# Patient Record
Sex: FEMALE | NOT HISPANIC OR LATINO
[De-identification: names, ages, dates, MRNs, and addresses within clinical notes are randomized per-mention and may not be internally consistent; named-entity substitution may affect disease eponyms.]

---

## 2017-06-16 ENCOUNTER — APPOINTMENT (OUTPATIENT)
Dept: UROLOGY | Facility: CLINIC | Age: 77
End: 2017-06-16

## 2017-12-08 ENCOUNTER — APPOINTMENT (OUTPATIENT)
Dept: UROLOGY | Facility: CLINIC | Age: 77
End: 2017-12-08
Payer: MEDICARE

## 2017-12-08 PROCEDURE — 99213 OFFICE O/P EST LOW 20 MIN: CPT

## 2017-12-26 ENCOUNTER — OUTPATIENT (OUTPATIENT)
Dept: OUTPATIENT SERVICES | Facility: HOSPITAL | Age: 77
LOS: 1 days | Discharge: HOME | End: 2017-12-26
Payer: MEDICARE

## 2017-12-26 DIAGNOSIS — C49.9 MALIGNANT NEOPLASM OF CONNECTIVE AND SOFT TISSUE, UNSPECIFIED: ICD-10-CM

## 2018-01-02 ENCOUNTER — RESULT REVIEW (OUTPATIENT)
Age: 78
End: 2018-01-02

## 2018-01-02 ENCOUNTER — APPOINTMENT (OUTPATIENT)
Dept: HEMATOLOGY ONCOLOGY | Facility: CLINIC | Age: 78
End: 2018-01-02
Payer: MEDICARE

## 2018-01-02 ENCOUNTER — LABORATORY RESULT (OUTPATIENT)
Age: 78
End: 2018-01-02

## 2018-01-02 VITALS
WEIGHT: 143.3 LBS | HEART RATE: 76 BPM | OXYGEN SATURATION: 99 % | TEMPERATURE: 98.1 F | SYSTOLIC BLOOD PRESSURE: 140 MMHG | BODY MASS INDEX: 26.21 KG/M2 | DIASTOLIC BLOOD PRESSURE: 80 MMHG | RESPIRATION RATE: 16 BRPM

## 2018-01-02 DIAGNOSIS — I10 ESSENTIAL (PRIMARY) HYPERTENSION: ICD-10-CM

## 2018-01-02 LAB
HCT VFR BLD CALC: 41.8 % — SIGNIFICANT CHANGE UP (ref 34.5–45)
HGB BLD-MCNC: 14.2 G/DL — SIGNIFICANT CHANGE UP (ref 11.5–15.5)
MCHC RBC-ENTMCNC: 30.3 PG — SIGNIFICANT CHANGE UP (ref 27–34)
MCHC RBC-ENTMCNC: 34 G/DL — SIGNIFICANT CHANGE UP (ref 32–36)
MCV RBC AUTO: 89.2 FL — SIGNIFICANT CHANGE UP (ref 80–100)
PLATELET # BLD AUTO: 198 K/UL — SIGNIFICANT CHANGE UP (ref 150–400)
RBC # BLD: 4.69 M/UL — SIGNIFICANT CHANGE UP (ref 3.8–5.2)
RBC # FLD: 12 % — SIGNIFICANT CHANGE UP (ref 10.3–14.5)
WBC # BLD: 5.4 K/UL — SIGNIFICANT CHANGE UP (ref 3.8–10.5)
WBC # FLD AUTO: 5.4 K/UL — SIGNIFICANT CHANGE UP (ref 3.8–10.5)

## 2018-01-02 PROCEDURE — 99205 OFFICE O/P NEW HI 60 MIN: CPT

## 2018-01-02 RX ORDER — ROSUVASTATIN CALCIUM 10 MG/1
10 TABLET, FILM COATED ORAL
Refills: 0 | Status: ACTIVE | COMMUNITY

## 2018-01-02 RX ORDER — LISINOPRIL 40 MG/1
40 TABLET ORAL
Refills: 0 | Status: ACTIVE | COMMUNITY

## 2018-01-03 DIAGNOSIS — C78.6 SECONDARY MALIGNANT NEOPLASM OF RETROPERITONEUM AND PERITONEUM: ICD-10-CM

## 2018-01-11 ENCOUNTER — RESULT REVIEW (OUTPATIENT)
Age: 78
End: 2018-01-11

## 2018-01-11 PROCEDURE — 88321 CONSLTJ&REPRT SLD PREP ELSWR: CPT

## 2018-03-22 ENCOUNTER — OUTPATIENT (OUTPATIENT)
Dept: OUTPATIENT SERVICES | Facility: HOSPITAL | Age: 78
LOS: 1 days | Discharge: HOME | End: 2018-03-22

## 2018-03-22 DIAGNOSIS — C78.6 SECONDARY MALIGNANT NEOPLASM OF RETROPERITONEUM AND PERITONEUM: ICD-10-CM

## 2018-03-26 ENCOUNTER — APPOINTMENT (OUTPATIENT)
Dept: HEMATOLOGY ONCOLOGY | Facility: CLINIC | Age: 78
End: 2018-03-26
Payer: MEDICARE

## 2018-03-26 PROCEDURE — 99214 OFFICE O/P EST MOD 30 MIN: CPT

## 2018-06-07 ENCOUNTER — OUTPATIENT (OUTPATIENT)
Dept: OUTPATIENT SERVICES | Facility: HOSPITAL | Age: 78
LOS: 1 days | Discharge: HOME | End: 2018-06-07

## 2018-06-07 DIAGNOSIS — C78.6 SECONDARY MALIGNANT NEOPLASM OF RETROPERITONEUM AND PERITONEUM: ICD-10-CM

## 2018-06-08 ENCOUNTER — APPOINTMENT (OUTPATIENT)
Dept: UROLOGY | Facility: CLINIC | Age: 78
End: 2018-06-08

## 2018-06-11 ENCOUNTER — APPOINTMENT (OUTPATIENT)
Dept: HEMATOLOGY ONCOLOGY | Facility: CLINIC | Age: 78
End: 2018-06-11
Payer: MEDICARE

## 2018-06-11 VITALS
WEIGHT: 138.89 LBS | RESPIRATION RATE: 16 BRPM | SYSTOLIC BLOOD PRESSURE: 130 MMHG | HEART RATE: 69 BPM | DIASTOLIC BLOOD PRESSURE: 70 MMHG | OXYGEN SATURATION: 97 % | TEMPERATURE: 98 F | BODY MASS INDEX: 25.4 KG/M2

## 2018-06-11 PROCEDURE — 99215 OFFICE O/P EST HI 40 MIN: CPT

## 2018-06-28 ENCOUNTER — APPOINTMENT (OUTPATIENT)
Dept: HEMATOLOGY ONCOLOGY | Facility: CLINIC | Age: 78
End: 2018-06-28
Payer: MEDICARE

## 2018-06-28 VITALS
TEMPERATURE: 62 F | DIASTOLIC BLOOD PRESSURE: 85 MMHG | HEART RATE: 76 BPM | OXYGEN SATURATION: 98 % | RESPIRATION RATE: 16 BRPM | BODY MASS INDEX: 25 KG/M2 | WEIGHT: 136.69 LBS | SYSTOLIC BLOOD PRESSURE: 167 MMHG

## 2018-06-28 PROCEDURE — 99215 OFFICE O/P EST HI 40 MIN: CPT

## 2018-08-28 ENCOUNTER — OUTPATIENT (OUTPATIENT)
Dept: OUTPATIENT SERVICES | Facility: HOSPITAL | Age: 78
LOS: 1 days | Discharge: HOME | End: 2018-08-28

## 2018-08-28 DIAGNOSIS — C78.6 SECONDARY MALIGNANT NEOPLASM OF RETROPERITONEUM AND PERITONEUM: ICD-10-CM

## 2018-09-06 ENCOUNTER — APPOINTMENT (OUTPATIENT)
Dept: HEMATOLOGY ONCOLOGY | Facility: CLINIC | Age: 78
End: 2018-09-06
Payer: MEDICARE

## 2018-09-06 VITALS
TEMPERATURE: 98.6 F | BODY MASS INDEX: 24.8 KG/M2 | SYSTOLIC BLOOD PRESSURE: 188 MMHG | OXYGEN SATURATION: 97 % | DIASTOLIC BLOOD PRESSURE: 76 MMHG | WEIGHT: 135.56 LBS | RESPIRATION RATE: 16 BRPM | HEART RATE: 68 BPM

## 2018-09-06 PROCEDURE — 99215 OFFICE O/P EST HI 40 MIN: CPT

## 2018-09-24 ENCOUNTER — RESULT REVIEW (OUTPATIENT)
Age: 78
End: 2018-09-24

## 2018-09-24 ENCOUNTER — OUTPATIENT (OUTPATIENT)
Dept: OUTPATIENT SERVICES | Facility: HOSPITAL | Age: 78
LOS: 1 days | End: 2018-09-24
Payer: MEDICARE

## 2018-09-24 DIAGNOSIS — C18.9 MALIGNANT NEOPLASM OF COLON, UNSPECIFIED: ICD-10-CM

## 2018-09-24 LAB — SURGICAL PATHOLOGY STUDY: SIGNIFICANT CHANGE UP

## 2018-10-10 VITALS
OXYGEN SATURATION: 96 % | WEIGHT: 130.95 LBS | HEIGHT: 59 IN | SYSTOLIC BLOOD PRESSURE: 176 MMHG | HEART RATE: 73 BPM | DIASTOLIC BLOOD PRESSURE: 79 MMHG | RESPIRATION RATE: 18 BRPM | TEMPERATURE: 97 F

## 2018-10-11 ENCOUNTER — RESULT REVIEW (OUTPATIENT)
Age: 78
End: 2018-10-11

## 2018-10-11 ENCOUNTER — INPATIENT (INPATIENT)
Facility: HOSPITAL | Age: 78
LOS: 4 days | Discharge: NURSING, REHAB OR LONG-TERM CARE | DRG: 827 | End: 2018-10-16
Attending: SURGERY | Admitting: SURGERY
Payer: MEDICARE

## 2018-10-11 DIAGNOSIS — Z41.9 ENCOUNTER FOR PROCEDURE FOR PURPOSES OTHER THAN REMEDYING HEALTH STATE, UNSPECIFIED: Chronic | ICD-10-CM

## 2018-10-11 DIAGNOSIS — E78.5 HYPERLIPIDEMIA, UNSPECIFIED: ICD-10-CM

## 2018-10-11 DIAGNOSIS — E11.9 TYPE 2 DIABETES MELLITUS WITHOUT COMPLICATIONS: ICD-10-CM

## 2018-10-11 DIAGNOSIS — C78.5 SECONDARY MALIGNANT NEOPLASM OF LARGE INTESTINE AND RECTUM: ICD-10-CM

## 2018-10-11 DIAGNOSIS — I10 ESSENTIAL (PRIMARY) HYPERTENSION: ICD-10-CM

## 2018-10-11 DIAGNOSIS — C48.0 MALIGNANT NEOPLASM OF RETROPERITONEUM: ICD-10-CM

## 2018-10-11 DIAGNOSIS — K66.0 PERITONEAL ADHESIONS (POSTPROCEDURAL) (POSTINFECTION): ICD-10-CM

## 2018-10-11 DIAGNOSIS — Z85.528 PERSONAL HISTORY OF OTHER MALIGNANT NEOPLASM OF KIDNEY: ICD-10-CM

## 2018-10-11 DIAGNOSIS — Z90.49 ACQUIRED ABSENCE OF OTHER SPECIFIED PARTS OF DIGESTIVE TRACT: Chronic | ICD-10-CM

## 2018-10-11 DIAGNOSIS — F41.9 ANXIETY DISORDER, UNSPECIFIED: ICD-10-CM

## 2018-10-11 DIAGNOSIS — Z90.5 ACQUIRED ABSENCE OF KIDNEY: ICD-10-CM

## 2018-10-11 DIAGNOSIS — Z90.2 ACQUIRED ABSENCE OF LUNG [PART OF]: ICD-10-CM

## 2018-10-11 LAB
ALBUMIN SERPL ELPH-MCNC: 3.4 G/DL — SIGNIFICANT CHANGE UP (ref 3.3–5)
ALP SERPL-CCNC: 39 U/L — LOW (ref 40–120)
ALT FLD-CCNC: 124 U/L — HIGH (ref 10–45)
ANION GAP SERPL CALC-SCNC: 17 MMOL/L — SIGNIFICANT CHANGE UP (ref 5–17)
AST SERPL-CCNC: 211 U/L — HIGH (ref 10–40)
BASE EXCESS BLDA CALC-SCNC: -3.9 MMOL/L — LOW (ref -2–3)
BILIRUB SERPL-MCNC: 0.7 MG/DL — SIGNIFICANT CHANGE UP (ref 0.2–1.2)
BLD GP AB SCN SERPL QL: NEGATIVE — SIGNIFICANT CHANGE UP
BUN SERPL-MCNC: 11 MG/DL — SIGNIFICANT CHANGE UP (ref 7–23)
CA-I BLDA-SCNC: 1.1 MMOL/L — LOW (ref 1.12–1.3)
CALCIUM SERPL-MCNC: 8 MG/DL — LOW (ref 8.4–10.5)
CHLORIDE SERPL-SCNC: 105 MMOL/L — SIGNIFICANT CHANGE UP (ref 96–108)
CO2 SERPL-SCNC: 19 MMOL/L — LOW (ref 22–31)
COHGB MFR BLDA: 0.2 % — SIGNIFICANT CHANGE UP
CREAT SERPL-MCNC: 0.73 MG/DL — SIGNIFICANT CHANGE UP (ref 0.5–1.3)
GAS PNL BLDA: SIGNIFICANT CHANGE UP
GAS PNL BLDA: SIGNIFICANT CHANGE UP
GLUCOSE BLDC GLUCOMTR-MCNC: 112 MG/DL — HIGH (ref 70–99)
GLUCOSE BLDC GLUCOMTR-MCNC: 131 MG/DL — HIGH (ref 70–99)
GLUCOSE BLDC GLUCOMTR-MCNC: 161 MG/DL — HIGH (ref 70–99)
GLUCOSE SERPL-MCNC: 253 MG/DL — HIGH (ref 70–99)
HCO3 BLDA-SCNC: 20 MMOL/L — LOW (ref 21–28)
HCT VFR BLD CALC: 35.9 % — SIGNIFICANT CHANGE UP (ref 34.5–45)
HGB BLD-MCNC: 11.7 G/DL — SIGNIFICANT CHANGE UP (ref 11.5–15.5)
HGB BLDA-MCNC: 12 G/DL — SIGNIFICANT CHANGE UP (ref 11.5–15.5)
MAGNESIUM SERPL-MCNC: 1.6 MG/DL — SIGNIFICANT CHANGE UP (ref 1.6–2.6)
MCHC RBC-ENTMCNC: 29.3 PG — SIGNIFICANT CHANGE UP (ref 27–34)
MCHC RBC-ENTMCNC: 32.6 G/DL — SIGNIFICANT CHANGE UP (ref 32–36)
MCV RBC AUTO: 90 FL — SIGNIFICANT CHANGE UP (ref 80–100)
METHGB MFR BLDA: 0.4 % — SIGNIFICANT CHANGE UP
O2 CT VFR BLDA CALC: 17.8 ML/DL — SIGNIFICANT CHANGE UP (ref 15–23)
OXYHGB MFR BLDA: 99 % — SIGNIFICANT CHANGE UP (ref 94–100)
PCO2 BLDA: 32 MMHG — SIGNIFICANT CHANGE UP (ref 32–45)
PH BLDA: 7.41 — SIGNIFICANT CHANGE UP (ref 7.35–7.45)
PHOSPHATE SERPL-MCNC: 3.7 MG/DL — SIGNIFICANT CHANGE UP (ref 2.5–4.5)
PLATELET # BLD AUTO: 201 K/UL — SIGNIFICANT CHANGE UP (ref 150–400)
PO2 BLDA: 404 MMHG — HIGH (ref 83–108)
POTASSIUM BLDA-SCNC: 3.4 MMOL/L — LOW (ref 3.5–4.9)
POTASSIUM SERPL-MCNC: 3.6 MMOL/L — SIGNIFICANT CHANGE UP (ref 3.5–5.3)
POTASSIUM SERPL-SCNC: 3.6 MMOL/L — SIGNIFICANT CHANGE UP (ref 3.5–5.3)
PROT SERPL-MCNC: 5.5 G/DL — LOW (ref 6–8.3)
RBC # BLD: 3.99 M/UL — SIGNIFICANT CHANGE UP (ref 3.8–5.2)
RBC # FLD: 13.3 % — SIGNIFICANT CHANGE UP (ref 10.3–16.9)
RH IG SCN BLD-IMP: POSITIVE — SIGNIFICANT CHANGE UP
SAO2 % BLDA: 100 % — SIGNIFICANT CHANGE UP (ref 95–100)
SODIUM BLDA-SCNC: 135 MMOL/L — LOW (ref 138–146)
SODIUM SERPL-SCNC: 141 MMOL/L — SIGNIFICANT CHANGE UP (ref 135–145)
WBC # BLD: 9.8 K/UL — SIGNIFICANT CHANGE UP (ref 3.8–10.5)
WBC # FLD AUTO: 9.8 K/UL — SIGNIFICANT CHANGE UP (ref 3.8–10.5)

## 2018-10-11 RX ORDER — METOCLOPRAMIDE HCL 10 MG
10 TABLET ORAL EVERY 6 HOURS
Qty: 0 | Refills: 0 | Status: DISCONTINUED | OUTPATIENT
Start: 2018-10-11 | End: 2018-10-16

## 2018-10-11 RX ORDER — EZETIMIBE 10 MG/1
1 TABLET ORAL
Qty: 0 | Refills: 0 | COMMUNITY

## 2018-10-11 RX ORDER — HYDROMORPHONE HYDROCHLORIDE 2 MG/ML
1 INJECTION INTRAMUSCULAR; INTRAVENOUS; SUBCUTANEOUS EVERY 4 HOURS
Qty: 0 | Refills: 0 | Status: DISCONTINUED | OUTPATIENT
Start: 2018-10-11 | End: 2018-10-16

## 2018-10-11 RX ORDER — LISINOPRIL 2.5 MG/1
1 TABLET ORAL
Qty: 0 | Refills: 0 | COMMUNITY

## 2018-10-11 RX ORDER — SODIUM CHLORIDE 9 MG/ML
1000 INJECTION, SOLUTION INTRAVENOUS
Qty: 0 | Refills: 0 | Status: DISCONTINUED | OUTPATIENT
Start: 2018-10-11 | End: 2018-10-13

## 2018-10-11 RX ORDER — HYDROMORPHONE HYDROCHLORIDE 2 MG/ML
0.5 INJECTION INTRAMUSCULAR; INTRAVENOUS; SUBCUTANEOUS EVERY 4 HOURS
Qty: 0 | Refills: 0 | Status: DISCONTINUED | OUTPATIENT
Start: 2018-10-11 | End: 2018-10-16

## 2018-10-11 RX ORDER — ROSUVASTATIN CALCIUM 5 MG/1
1 TABLET ORAL
Qty: 0 | Refills: 0 | COMMUNITY

## 2018-10-11 RX ORDER — IBANDRONATE SODIUM 150 MG/1
0 TABLET ORAL
Qty: 0 | Refills: 0 | COMMUNITY

## 2018-10-11 RX ORDER — POTASSIUM CHLORIDE 20 MEQ
10 PACKET (EA) ORAL
Qty: 0 | Refills: 0 | Status: COMPLETED | OUTPATIENT
Start: 2018-10-11 | End: 2018-10-11

## 2018-10-11 RX ORDER — BUPIVACAINE 13.3 MG/ML
20 INJECTION, SUSPENSION, LIPOSOMAL INFILTRATION ONCE
Qty: 0 | Refills: 0 | Status: DISCONTINUED | OUTPATIENT
Start: 2018-10-11 | End: 2018-10-16

## 2018-10-11 RX ORDER — HEPARIN SODIUM 5000 [USP'U]/ML
5000 INJECTION INTRAVENOUS; SUBCUTANEOUS EVERY 8 HOURS
Qty: 0 | Refills: 0 | Status: DISCONTINUED | OUTPATIENT
Start: 2018-10-11 | End: 2018-10-16

## 2018-10-11 RX ORDER — ASPIRIN/CALCIUM CARB/MAGNESIUM 324 MG
1 TABLET ORAL
Qty: 0 | Refills: 0 | COMMUNITY

## 2018-10-11 RX ORDER — ONDANSETRON 8 MG/1
4 TABLET, FILM COATED ORAL EVERY 6 HOURS
Qty: 0 | Refills: 0 | Status: DISCONTINUED | OUTPATIENT
Start: 2018-10-11 | End: 2018-10-16

## 2018-10-11 RX ORDER — MAGNESIUM SULFATE 500 MG/ML
2 VIAL (ML) INJECTION EVERY 6 HOURS
Qty: 0 | Refills: 0 | Status: COMPLETED | OUTPATIENT
Start: 2018-10-11 | End: 2018-10-12

## 2018-10-11 RX ORDER — AMLODIPINE BESYLATE 2.5 MG/1
1 TABLET ORAL
Qty: 0 | Refills: 0 | COMMUNITY

## 2018-10-11 RX ADMIN — Medication 100 MILLIEQUIVALENT(S): at 23:09

## 2018-10-11 RX ADMIN — HYDROMORPHONE HYDROCHLORIDE 1 MILLIGRAM(S): 2 INJECTION INTRAMUSCULAR; INTRAVENOUS; SUBCUTANEOUS at 15:35

## 2018-10-11 RX ADMIN — HYDROMORPHONE HYDROCHLORIDE 1 MILLIGRAM(S): 2 INJECTION INTRAMUSCULAR; INTRAVENOUS; SUBCUTANEOUS at 23:54

## 2018-10-11 RX ADMIN — HEPARIN SODIUM 5000 UNIT(S): 5000 INJECTION INTRAVENOUS; SUBCUTANEOUS at 23:09

## 2018-10-11 RX ADMIN — Medication 10 MILLIGRAM(S): at 18:41

## 2018-10-11 RX ADMIN — ONDANSETRON 4 MILLIGRAM(S): 8 TABLET, FILM COATED ORAL at 17:23

## 2018-10-11 RX ADMIN — Medication 100 MILLIEQUIVALENT(S): at 20:10

## 2018-10-11 RX ADMIN — HYDROMORPHONE HYDROCHLORIDE 1 MILLIGRAM(S): 2 INJECTION INTRAMUSCULAR; INTRAVENOUS; SUBCUTANEOUS at 16:11

## 2018-10-11 RX ADMIN — Medication 100 MILLIEQUIVALENT(S): at 17:52

## 2018-10-11 RX ADMIN — Medication 50 GRAM(S): at 23:09

## 2018-10-11 NOTE — BRIEF OPERATIVE NOTE - OPERATION/FINDINGS
Diagnostic laparoscopy revealed no widespread metastatic disease; right colon at hepatic flexure firm with mass. Adhesions encountered and lysed; umbilical hernia reduced laparoscopically. Procedure converted to open and a midline laparotomy incision made. Right colon somewhat mobile at hepatic flexure but matted at right abdominal wall; colonic mesentery with tumor, matted against D3. Frozen section of ruby-hepatic nodule showed atypia. Right colon mobilized and dissected off liver and mesentery dissected off portion off duodenum without focal invasion. Serosal injury to D3 repaired primarily with silk; tongue of omentum used as a pedicled omental graft over repair. Ileocolonic primary stapled anastomosis performed with linear stapler. Omentum tacked to anastomosis. Abdomen irrigated and facia closed.

## 2018-10-11 NOTE — BRIEF OPERATIVE NOTE - PROCEDURE
<<-----Click on this checkbox to enter Procedure Right hemicolectomy  10/11/2018    Active  BBASSIRITE  Exploratory laparotomy  10/11/2018    Active  BBASSIRITE  Lysis of adhesions  10/11/2018    Active  BBASSIRITE  Diagnostic laparoscopy  10/11/2018    Active  BBASSIRITE

## 2018-10-11 NOTE — PROGRESS NOTE ADULT - SUBJECTIVE AND OBJECTIVE BOX
Procedure: diag lap w/ BRIONNA converted to open with R hemicolectomy   Surgeon: Song Richards    S: Pt has no complaints. Denies CP, SOB, CANCINO, calf tenderness. Pain controlled with medication.    O:  T(C): --  T(F): --  HR: 74 (10-11-18 @ 16:20) (68 - 76)  BP: 138/60 (10-11-18 @ 16:20) (100/44 - 138/60)  RR: 13 (10-11-18 @ 16:20) (12 - 19)  SpO2: 100% (10-11-18 @ 16:20) (95% - 100%)  Wt(kg): --                        11.7   9.8   )-----------( 201      ( 11 Oct 2018 15:33 )             35.9     10-11    141  |  105  |  11  ----------------------------<  253<H>  3.6   |  19<L>  |  0.73    Ca    8.0<L>      11 Oct 2018 15:32  Phos  3.7     10-11  Mg     1.6     10-11    TPro  5.5<L>  /  Alb  3.4  /  TBili  0.7  /  DBili  x   /  AST  211<H>  /  ALT  124<H>  /  AlkPhos  39<L>  10-11      Gen: NAD, resting comfortably in bed  C/V: NSR  Pulm: Nonlabored breathing, no respiratory distress  Abd: soft, NT/ND Incision: midline incision and lap ports CDI  Extrem: WWP, no calf edema, SCDs in place      A/P: 78yFemale s/p above procedure  Diet: NPO  IVF: LR  Pain/nausea control  DVT ppx: SCDs, SQH  [place] inplace

## 2018-10-12 ENCOUNTER — TRANSCRIPTION ENCOUNTER (OUTPATIENT)
Age: 78
End: 2018-10-12

## 2018-10-12 DIAGNOSIS — Z98.891 HISTORY OF UTERINE SCAR FROM PREVIOUS SURGERY: Chronic | ICD-10-CM

## 2018-10-12 DIAGNOSIS — Z90.49 ACQUIRED ABSENCE OF OTHER SPECIFIED PARTS OF DIGESTIVE TRACT: Chronic | ICD-10-CM

## 2018-10-12 LAB
ALBUMIN SERPL ELPH-MCNC: 3.6 G/DL — SIGNIFICANT CHANGE UP (ref 3.3–5)
ALP SERPL-CCNC: 39 U/L — LOW (ref 40–120)
ALT FLD-CCNC: 98 U/L — HIGH (ref 10–45)
ANION GAP SERPL CALC-SCNC: 18 MMOL/L — HIGH (ref 5–17)
AST SERPL-CCNC: 112 U/L — HIGH (ref 10–40)
BILIRUB SERPL-MCNC: 0.4 MG/DL — SIGNIFICANT CHANGE UP (ref 0.2–1.2)
BUN SERPL-MCNC: 14 MG/DL — SIGNIFICANT CHANGE UP (ref 7–23)
CALCIUM SERPL-MCNC: 8.3 MG/DL — LOW (ref 8.4–10.5)
CHLORIDE SERPL-SCNC: 103 MMOL/L — SIGNIFICANT CHANGE UP (ref 96–108)
CO2 SERPL-SCNC: 19 MMOL/L — LOW (ref 22–31)
CREAT SERPL-MCNC: 0.99 MG/DL — SIGNIFICANT CHANGE UP (ref 0.5–1.3)
GLUCOSE SERPL-MCNC: 250 MG/DL — HIGH (ref 70–99)
HCT VFR BLD CALC: 33.4 % — LOW (ref 34.5–45)
HGB BLD-MCNC: 10.9 G/DL — LOW (ref 11.5–15.5)
MAGNESIUM SERPL-MCNC: 3.6 MG/DL — HIGH (ref 1.6–2.6)
MCHC RBC-ENTMCNC: 29.9 PG — SIGNIFICANT CHANGE UP (ref 27–34)
MCHC RBC-ENTMCNC: 32.6 G/DL — SIGNIFICANT CHANGE UP (ref 32–36)
MCV RBC AUTO: 91.5 FL — SIGNIFICANT CHANGE UP (ref 80–100)
PHOSPHATE SERPL-MCNC: 3 MG/DL — SIGNIFICANT CHANGE UP (ref 2.5–4.5)
PLATELET # BLD AUTO: 169 K/UL — SIGNIFICANT CHANGE UP (ref 150–400)
POTASSIUM SERPL-MCNC: 4.2 MMOL/L — SIGNIFICANT CHANGE UP (ref 3.5–5.3)
POTASSIUM SERPL-SCNC: 4.2 MMOL/L — SIGNIFICANT CHANGE UP (ref 3.5–5.3)
PROT SERPL-MCNC: 6 G/DL — SIGNIFICANT CHANGE UP (ref 6–8.3)
RBC # BLD: 3.65 M/UL — LOW (ref 3.8–5.2)
RBC # FLD: 13.7 % — SIGNIFICANT CHANGE UP (ref 10.3–16.9)
SODIUM SERPL-SCNC: 140 MMOL/L — SIGNIFICANT CHANGE UP (ref 135–145)
WBC # BLD: 12.5 K/UL — HIGH (ref 3.8–10.5)
WBC # FLD AUTO: 12.5 K/UL — HIGH (ref 3.8–10.5)

## 2018-10-12 RX ORDER — PANTOPRAZOLE SODIUM 20 MG/1
40 TABLET, DELAYED RELEASE ORAL
Qty: 0 | Refills: 0 | Status: DISCONTINUED | OUTPATIENT
Start: 2018-10-12 | End: 2018-10-16

## 2018-10-12 RX ORDER — SODIUM CHLORIDE 9 MG/ML
1000 INJECTION, SOLUTION INTRAVENOUS ONCE
Qty: 0 | Refills: 0 | Status: COMPLETED | OUTPATIENT
Start: 2018-10-12 | End: 2018-10-12

## 2018-10-12 RX ORDER — AMLODIPINE BESYLATE 2.5 MG/1
5 TABLET ORAL DAILY
Qty: 0 | Refills: 0 | Status: DISCONTINUED | OUTPATIENT
Start: 2018-10-13 | End: 2018-10-16

## 2018-10-12 RX ADMIN — HYDROMORPHONE HYDROCHLORIDE 1 MILLIGRAM(S): 2 INJECTION INTRAMUSCULAR; INTRAVENOUS; SUBCUTANEOUS at 19:24

## 2018-10-12 RX ADMIN — HYDROMORPHONE HYDROCHLORIDE 1 MILLIGRAM(S): 2 INJECTION INTRAMUSCULAR; INTRAVENOUS; SUBCUTANEOUS at 00:17

## 2018-10-12 RX ADMIN — HYDROMORPHONE HYDROCHLORIDE 1 MILLIGRAM(S): 2 INJECTION INTRAMUSCULAR; INTRAVENOUS; SUBCUTANEOUS at 04:13

## 2018-10-12 RX ADMIN — HYDROMORPHONE HYDROCHLORIDE 1 MILLIGRAM(S): 2 INJECTION INTRAMUSCULAR; INTRAVENOUS; SUBCUTANEOUS at 23:15

## 2018-10-12 RX ADMIN — SODIUM CHLORIDE 4000 MILLILITER(S): 9 INJECTION, SOLUTION INTRAVENOUS at 19:24

## 2018-10-12 RX ADMIN — HYDROMORPHONE HYDROCHLORIDE 1 MILLIGRAM(S): 2 INJECTION INTRAMUSCULAR; INTRAVENOUS; SUBCUTANEOUS at 13:08

## 2018-10-12 RX ADMIN — HYDROMORPHONE HYDROCHLORIDE 1 MILLIGRAM(S): 2 INJECTION INTRAMUSCULAR; INTRAVENOUS; SUBCUTANEOUS at 04:45

## 2018-10-12 RX ADMIN — PANTOPRAZOLE SODIUM 40 MILLIGRAM(S): 20 TABLET, DELAYED RELEASE ORAL at 22:01

## 2018-10-12 RX ADMIN — HEPARIN SODIUM 5000 UNIT(S): 5000 INJECTION INTRAVENOUS; SUBCUTANEOUS at 06:05

## 2018-10-12 RX ADMIN — Medication 50 GRAM(S): at 04:13

## 2018-10-12 RX ADMIN — HEPARIN SODIUM 5000 UNIT(S): 5000 INJECTION INTRAVENOUS; SUBCUTANEOUS at 13:09

## 2018-10-12 RX ADMIN — HYDROMORPHONE HYDROCHLORIDE 1 MILLIGRAM(S): 2 INJECTION INTRAMUSCULAR; INTRAVENOUS; SUBCUTANEOUS at 13:43

## 2018-10-12 RX ADMIN — HEPARIN SODIUM 5000 UNIT(S): 5000 INJECTION INTRAVENOUS; SUBCUTANEOUS at 22:01

## 2018-10-12 RX ADMIN — SODIUM CHLORIDE 4000 MILLILITER(S): 9 INJECTION, SOLUTION INTRAVENOUS at 22:01

## 2018-10-12 RX ADMIN — HYDROMORPHONE HYDROCHLORIDE 1 MILLIGRAM(S): 2 INJECTION INTRAMUSCULAR; INTRAVENOUS; SUBCUTANEOUS at 23:45

## 2018-10-12 RX ADMIN — SODIUM CHLORIDE 100 MILLILITER(S): 9 INJECTION, SOLUTION INTRAVENOUS at 10:34

## 2018-10-12 NOTE — DISCHARGE NOTE ADULT - HOSPITAL COURSE
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 The patient tolerated the procedure well. Her burciaga was removed and she passed her trial of void. She was initially started with a diet of only sips as we awaited return of bowel function. She tolerated the diet well, and her diet was advanced as tolerated, as bowel function returned. She had occasional issues with hypertension that were resolved with administration of her home doses of hypertension medications. Physical therapy saw the patient and cleared her for home with home PT, though the family requested [facility]. On day of discharge patient is afebrile, VSS. She is tolerating her diet, and is out of bed/ambulating. She will be discharged with outpatient follow-up.

## 2018-10-12 NOTE — DISCHARGE NOTE ADULT - CARE PLAN
Principal Discharge DX:	Liposarcoma  Goal:	Follow-up  Assessment and plan of treatment:	Follow up with Dr. Kuo next Tuesday, October 23 in the [place] office ([address], [address] [place] [address]). Call [phone] to schedule your appointment.     You may shower; soap and water over incision sites. Do not scrub. Pat dry when done. No tub bathing or swimming until cleared. Keep incision sites out of the sun as scars will darken. Ambulate as tolerated, but no heavy lifting (>10lbs) or strenuous exercise. You may resume regular diet. You should be urinating at least 3-4x per day. Call the office if you experience increasing abdominal pain, nausea, vomiting, or temperature >101 F.  Goal:	Outpatient Medications  Assessment and plan of treatment:	You will be discharged with a short supply of Percocet for pain control. Please only use for severe pain as needed. Otherwise, use Tylenol/Advil/Motrin as needed. You will also be prescribed Colace to help with bowel function while taking narcotic medications. Prescriptions have been sent to your pharmacy.    Please resume your home medications unless otherwise stated. Please be sure to follow-up with your outpatient primary care provider to manage your medical care/medications.

## 2018-10-12 NOTE — H&P ADULT - ASSESSMENT
Patient is an 78 year old female with a PMH of liposarcoma with nephrectomy and lobectomy that presents for diagnostic laparoscopy for colonic mass.     Plan  -Diagnostic Laparoscopy

## 2018-10-12 NOTE — PHYSICAL THERAPY INITIAL EVALUATION ADULT - ADDITIONAL COMMENTS
Pt lives at home alone with elevator access and states has 1 JULIUS with 1 HR either side. PTA: indep with functional mobility, no Ad.

## 2018-10-12 NOTE — PROGRESS NOTE ADULT - SUBJECTIVE AND OBJECTIVE BOX
24 hr events:  O/N: MARYCHUY, VSS  10/11: Admitted; OR for R hemicolectomy; POC WNL    SUBJECTIVE:  No acute complaints. Pain well controlled. Denies N/V. +F/+BM. No CP/SOB.    Vital Signs Last 24 Hrs  T(C): 36.6 (12 Oct 2018 13:35), Max: 36.9 (11 Oct 2018 18:19)  T(F): 97.9 (12 Oct 2018 13:35), Max: 98.5 (11 Oct 2018 18:19)  HR: 92 (12 Oct 2018 12:44) (68 - 92)  BP: 134/61 (12 Oct 2018 12:44) (100/44 - 149/67)  BP(mean): 88 (12 Oct 2018 12:44) (60 - 99)  RR: 16 (12 Oct 2018 12:44) (12 - 19)  SpO2: 99% (12 Oct 2018 12:44) (95% - 100%)    Physical Exam:  General: NAD  Pulmonary: Nonlabored breathing, no respiratory distress  Abdominal: soft, appropriately tender, non-distended; incisions c/d/i  Neuro: A/O x3    Lines/drains/tubes:    I&O's Summary    11 Oct 2018 07:01  -  12 Oct 2018 07:00  --------------------------------------------------------  IN: 1700 mL / OUT: 680 mL / NET: 1020 mL    12 Oct 2018 07:01  -  12 Oct 2018 14:56  --------------------------------------------------------  IN: 0 mL / OUT: 350 mL / NET: -350 mL        LABS:                        10.9   12.5  )-----------( 169      ( 12 Oct 2018 05:33 )             33.4     10-12    140  |  103  |  14  ----------------------------<  250<H>  4.2   |  19<L>  |  0.99    Ca    8.3<L>      12 Oct 2018 05:33  Phos  3.0     10-12  Mg     3.6     10-12    TPro  6.0  /  Alb  3.6  /  TBili  0.4  /  DBili  x   /  AST  112<H>  /  ALT  98<H>  /  AlkPhos  39<L>  10-12        CAPILLARY BLOOD GLUCOSE        LIVER FUNCTIONS - ( 12 Oct 2018 05:33 )  Alb: 3.6 g/dL / Pro: 6.0 g/dL / ALK PHOS: 39 U/L / ALT: 98 U/L / AST: 112 U/L / GGT: x             RADIOLOGY & ADDITIONAL STUDIES:

## 2018-10-12 NOTE — DISCHARGE NOTE ADULT - COMMUNITY RESOURCES
[address] [id number] [phone] [facility]  [address] [place] [address]  [phone]

## 2018-10-12 NOTE — PROGRESS NOTE ADULT - SUBJECTIVE AND OBJECTIVE BOX
SUBJECTIVE: Patient was examined at bedside. She states that she feels well. She denies any emesis, bowel movement, flatus, chest pain, calf pain or palpitations. She endorses having nausea that comes and goes, but denies receiving medication for it. She wants to begin ambulating today.       MEDICATIONS  (STANDING):  BUpivacaine liposome 1.3% Injectable (no eMAR) 20 milliLiter(s) Local Injection once  heparin  Injectable 5000 Unit(s) SubCutaneous every 8 hours  lactated ringers. 1000 milliLiter(s) (100 mL/Hr) IV Continuous <Continuous>    MEDICATIONS  (PRN):  HYDROmorphone  Injectable 0.5 milliGRAM(s) IV Push every 4 hours PRN Moderate Pain (4 - 6)  HYDROmorphone  Injectable 1 milliGRAM(s) IV Push every 4 hours PRN Severe Pain (7 - 10)  metoclopramide Injectable 10 milliGRAM(s) IV Push every 6 hours PRN Nausea and/or Vomiting  ondansetron Injectable 4 milliGRAM(s) IV Push every 6 hours PRN Nausea      Vital Signs Last 24 Hrs  T(C): 36.1 (12 Oct 2018 09:25), Max: 36.9 (11 Oct 2018 18:19)  T(F): 96.9 (12 Oct 2018 09:25), Max: 98.5 (11 Oct 2018 18:19)  HR: 88 (12 Oct 2018 08:06) (68 - 88)  BP: 133/74 (12 Oct 2018 08:06) (100/44 - 149/67)  BP(mean): 96 (12 Oct 2018 08:06) (60 - 99)  RR: 14 (12 Oct 2018 08:06) (12 - 19)  SpO2: 99% (12 Oct 2018 08:06) (95% - 100%)    Physical Exam:  General: NAD, resting comfortably in bed  Pulmonary: lungs are clear to auscultation without wheezes or rhonchi.   Cardiovascular: Normal heart rate and rhythm.  Abdominal: soft, with appropriate tenderness to palpation. Incisions are dry without any erythema or discharge.   Extremities: No tenderness to calf palpation.   Neuro: A/O x 3      I&O's Summary    11 Oct 2018 07:01  -  12 Oct 2018 07:00  --------------------------------------------------------  IN: 1700 mL / OUT: 680 mL / NET: 1020 mL        LABS:                        10.9   12.5  )-----------( 169      ( 12 Oct 2018 05:33 )             33.4     10-12    140  |  103  |  14  ----------------------------<  250<H>  4.2   |  19<L>  |  0.99    Ca    8.3<L>      12 Oct 2018 05:33  Phos  3.0     10-12  Mg     3.6     10-12    TPro  6.0  /  Alb  3.6  /  TBili  0.4  /  DBili  x   /  AST  112<H>  /  ALT  98<H>  /  AlkPhos  39<L>  10-12        CAPILLARY BLOOD GLUCOSE      POCT Blood Glucose.: 161 mg/dL (11 Oct 2018 11:37)    LIVER FUNCTIONS - ( 12 Oct 2018 05:33 )  Alb: 3.6 g/dL / Pro: 6.0 g/dL / ALK PHOS: 39 U/L / ALT: 98 U/L / AST: 112 U/L / GGT: x             RADIOLOGY & ADDITIONAL STUDIES:

## 2018-10-12 NOTE — PHYSICAL THERAPY INITIAL EVALUATION ADULT - GENERAL OBSERVATIONS, REHAB EVAL
Rec'd pt seated in bedside chair, non-acute distress, +heplock, cleared for PT and OOB activity by RN donald. denies pain, 0/10.

## 2018-10-12 NOTE — H&P ADULT - PSH
H/O  section    History of appendectomy    S/P cholecystectomy    Surgery, elective  lobectomy 2016  Surgery, elective  right nephrectomy 2016

## 2018-10-12 NOTE — H&P ADULT - NSHPPHYSICALEXAM_GEN_ALL_CORE
PHYSICAL EXAM:    Constitutional: NAD, AOx3  Respiratory: Lungs clear to auscultation   Cardiovascular: Normal rate and rhythm   Gastrointestinal: Soft, non-tender abdomen to palpation with normo-active bowel sounds.   Extremities: No calf pain on palpation.

## 2018-10-12 NOTE — DISCHARGE NOTE ADULT - PATIENT PORTAL LINK FT
You can access the [facility] Patient Portal, offered by [facility], by registering with the following website: [email or web address]

## 2018-10-12 NOTE — DISCHARGE NOTE ADULT - MEDICATION SUMMARY - MEDICATIONS TO TAKE
I will START or STAY ON the medications listed below when I get home from the hospital:    aspirin 81 mg oral tablet, chewable  -- 1 tab(s) by mouth once a day  -- Indication: For Cardiovascular health    Percocet 5/325 oral tablet  -- 1 tab(s) by mouth every 6 hours, As Needed -for severe pain MDD:4 tabs   -- Caution federal law prohibits the transfer of this drug to any person other  than the person for whom it was prescribed.  May cause drowsiness.  Alcohol may intensify this effect.  Use care when operating dangerous machinery.  This prescription cannot be refilled.  This product contains acetaminophen.  Do not use  with any other product containing acetaminophen to prevent possible liver damage.  Using more of this medication than prescribed may cause serious breathing problems.    -- Indication: For Severe Pain    lisinopril 40 mg oral tablet  -- 1 tab(s) by mouth once a day  -- Indication: For Hypertension    rosuvastatin 5 mg oral tablet  -- 1 tab(s) by mouth once a day (at bedtime)  -- Indication: For Cholesterol    ezetimibe 10 mg oral tablet  -- 1 tab(s) by mouth once a day  -- Indication: For Cholesterol    ibandronate 150 mg oral tablet  -- orally once a day  -- Indication: For Osteoporosis    amLODIPine 5 mg oral tablet  -- 1 tab(s) by mouth once a day  -- Indication: For Hypertension    Colace 100 mg oral capsule  -- 1 cap(s) by mouth 2 times a day   -- Medication should be taken with plenty of water.    -- Indication: For Bowel Regimen    Calcium 500+D oral tablet, chewable  -- orally once a day  -- Indication: For Osteoporosis

## 2018-10-12 NOTE — DISCHARGE NOTE ADULT - CARE PROVIDER_API CALL
Eliel Kuo), Surgery  [address] [place] [address]  [address]  [place] [address]  Phone: [phone]  Fax: [phone]

## 2018-10-12 NOTE — PROGRESS NOTE ADULT - ASSESSMENT
Lady is an 78 year old female with a pmh of liposarcoma that presented with SBO symptoms 2/2 liposarcoma recurrence.     Plan  -NPO, sips   -IVF  -pain, nausea control prn  -DVT ppx  -burciaga removal

## 2018-10-12 NOTE — H&P ADULT - HISTORY OF PRESENT ILLNESS
Patient is an 78 year old female with a PMH of DM, HTN, and liposarcoma with nephrectomy and lobectomy. She presented for scheduled diagnostic laparotomy due to right colonic mass found on CT. Her liposarcoma hx began three years ago when she presented to her PCP with excruciating pains that went from her posterior right thorax down the back of her right leg. A CT scan was ordered with showed a large mass involving the right kidney that extended retroperitoneally and also involved the inferior lobe of the right lung. This lead to a nephrectomy and lobectomy in 2016. She then proceeded with scheduled CT scans every 3, then 6 months for follow-up. Her most recent CT showed a mass involving the right colon. She was subsequently scheduled for a diagnostic laparoscopy for further exploration.

## 2018-10-12 NOTE — PROGRESS NOTE ADULT - ASSESSMENT
77 y/o F with h/o of HTN, CVA s/p R hemicolectomy    Sips  IVF  pain,nausea control prn  DVT ppx  am labs  OOB/A  Consult PT

## 2018-10-12 NOTE — DISCHARGE NOTE ADULT - PLAN OF CARE
Outpatient Medications You will be discharged with a short supply of Percocet for pain control. Please only use for severe pain as needed. Otherwise, use Tylenol/Advil/Motrin as needed. You will also be prescribed Colace to help with bowel function while taking narcotic medications. Prescriptions have been sent to your pharmacy.    Please resume your home medications unless otherwise stated. Please be sure to follow-up with your outpatient primary care provider to manage your medical care/medications. Follow-up Follow up with Dr. Kuo next Tuesday, October 23 in the [place] office ([address], [address] [place] [address]). Call [phone] to schedule your appointment.     You may shower; soap and water over incision sites. Do not scrub. Pat dry when done. No tub bathing or swimming until cleared. Keep incision sites out of the sun as scars will darken. Ambulate as tolerated, but no heavy lifting (>10lbs) or strenuous exercise. You may resume regular diet. You should be urinating at least 3-4x per day. Call the office if you experience increasing abdominal pain, nausea, vomiting, or temperature >101 F.

## 2018-10-13 LAB
ANION GAP SERPL CALC-SCNC: 12 MMOL/L — SIGNIFICANT CHANGE UP (ref 5–17)
BUN SERPL-MCNC: 13 MG/DL — SIGNIFICANT CHANGE UP (ref 7–23)
CALCIUM SERPL-MCNC: 8.5 MG/DL — SIGNIFICANT CHANGE UP (ref 8.4–10.5)
CHLORIDE SERPL-SCNC: 102 MMOL/L — SIGNIFICANT CHANGE UP (ref 96–108)
CO2 SERPL-SCNC: 25 MMOL/L — SIGNIFICANT CHANGE UP (ref 22–31)
CREAT SERPL-MCNC: 0.95 MG/DL — SIGNIFICANT CHANGE UP (ref 0.5–1.3)
GLUCOSE SERPL-MCNC: 148 MG/DL — HIGH (ref 70–99)
HCT VFR BLD CALC: 30.3 % — LOW (ref 34.5–45)
HGB BLD-MCNC: 9.9 G/DL — LOW (ref 11.5–15.5)
MAGNESIUM SERPL-MCNC: 2.6 MG/DL — SIGNIFICANT CHANGE UP (ref 1.6–2.6)
MCHC RBC-ENTMCNC: 29.8 PG — SIGNIFICANT CHANGE UP (ref 27–34)
MCHC RBC-ENTMCNC: 32.7 G/DL — SIGNIFICANT CHANGE UP (ref 32–36)
MCV RBC AUTO: 91.3 FL — SIGNIFICANT CHANGE UP (ref 80–100)
PHOSPHATE SERPL-MCNC: 2.3 MG/DL — LOW (ref 2.5–4.5)
PLATELET # BLD AUTO: 167 K/UL — SIGNIFICANT CHANGE UP (ref 150–400)
POTASSIUM SERPL-MCNC: 4.4 MMOL/L — SIGNIFICANT CHANGE UP (ref 3.5–5.3)
POTASSIUM SERPL-SCNC: 4.4 MMOL/L — SIGNIFICANT CHANGE UP (ref 3.5–5.3)
RBC # BLD: 3.32 M/UL — LOW (ref 3.8–5.2)
RBC # FLD: 14 % — SIGNIFICANT CHANGE UP (ref 10.3–16.9)
SODIUM SERPL-SCNC: 139 MMOL/L — SIGNIFICANT CHANGE UP (ref 135–145)
WBC # BLD: 10.6 K/UL — HIGH (ref 3.8–10.5)
WBC # FLD AUTO: 10.6 K/UL — HIGH (ref 3.8–10.5)

## 2018-10-13 RX ORDER — SODIUM CHLORIDE 9 MG/ML
1000 INJECTION, SOLUTION INTRAVENOUS
Qty: 0 | Refills: 0 | Status: DISCONTINUED | OUTPATIENT
Start: 2018-10-13 | End: 2018-10-14

## 2018-10-13 RX ORDER — SUCRALFATE 1 G
1 TABLET ORAL EVERY 6 HOURS
Qty: 0 | Refills: 0 | Status: DISCONTINUED | OUTPATIENT
Start: 2018-10-13 | End: 2018-10-16

## 2018-10-13 RX ORDER — POTASSIUM PHOSPHATE, MONOBASIC POTASSIUM PHOSPHATE, DIBASIC 236; 224 MG/ML; MG/ML
15 INJECTION, SOLUTION INTRAVENOUS ONCE
Qty: 0 | Refills: 0 | Status: COMPLETED | OUTPATIENT
Start: 2018-10-13 | End: 2018-10-13

## 2018-10-13 RX ADMIN — POTASSIUM PHOSPHATE, MONOBASIC POTASSIUM PHOSPHATE, DIBASIC 62.5 MILLIMOLE(S): 236; 224 INJECTION, SOLUTION INTRAVENOUS at 07:52

## 2018-10-13 RX ADMIN — HEPARIN SODIUM 5000 UNIT(S): 5000 INJECTION INTRAVENOUS; SUBCUTANEOUS at 13:29

## 2018-10-13 RX ADMIN — AMLODIPINE BESYLATE 5 MILLIGRAM(S): 2.5 TABLET ORAL at 06:13

## 2018-10-13 RX ADMIN — HYDROMORPHONE HYDROCHLORIDE 1 MILLIGRAM(S): 2 INJECTION INTRAMUSCULAR; INTRAVENOUS; SUBCUTANEOUS at 07:56

## 2018-10-13 RX ADMIN — HYDROMORPHONE HYDROCHLORIDE 1 MILLIGRAM(S): 2 INJECTION INTRAMUSCULAR; INTRAVENOUS; SUBCUTANEOUS at 08:24

## 2018-10-13 RX ADMIN — PANTOPRAZOLE SODIUM 40 MILLIGRAM(S): 20 TABLET, DELAYED RELEASE ORAL at 06:13

## 2018-10-13 RX ADMIN — HEPARIN SODIUM 5000 UNIT(S): 5000 INJECTION INTRAVENOUS; SUBCUTANEOUS at 22:59

## 2018-10-13 RX ADMIN — SODIUM CHLORIDE 100 MILLILITER(S): 9 INJECTION, SOLUTION INTRAVENOUS at 10:50

## 2018-10-13 RX ADMIN — HEPARIN SODIUM 5000 UNIT(S): 5000 INJECTION INTRAVENOUS; SUBCUTANEOUS at 06:13

## 2018-10-13 RX ADMIN — Medication 1 GRAM(S): at 18:05

## 2018-10-13 RX ADMIN — Medication 1 GRAM(S): at 13:29

## 2018-10-13 NOTE — PROGRESS NOTE ADULT - ASSESSMENT
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

## 2018-10-13 NOTE — PROGRESS NOTE ADULT - SUBJECTIVE AND OBJECTIVE BOX
SUBJECTIVE: No acute events overnight. +BM/flatus.    Vital Signs Last 24 Hrs  T(C): 36.9 (13 Oct 2018 14:00), Max: 37.5 (12 Oct 2018 22:00)  T(F): 98.5 (13 Oct 2018 14:00), Max: 99.5 (12 Oct 2018 22:00)  HR: 78 (13 Oct 2018 12:05) (72 - 100)  BP: 135/61 (13 Oct 2018 12:05) (125/63 - 166/75)  BP(mean): 88 (13 Oct 2018 12:05) (88 - 108)  RR: 18 (13 Oct 2018 12:05) (18 - 18)  SpO2: 91% (13 Oct 2018 12:05) (91% - 98%)    General: NAD, resting comfortably in bed  Pulm: Nonlabored breathing, no respiratory distress  Abd: soft, appropriately ttp, ND, incisions cdi    LABS:                        9.9    10.6  )-----------( 167      ( 13 Oct 2018 05:51 )             30.3     10-13    139  |  102  |  13  ----------------------------<  148<H>  4.4   |  25  |  0.95    Ca    8.5      13 Oct 2018 05:51  Phos  2.3     10-13  Mg     2.6     10-13    TPro  6.0  /  Alb  3.6  /  TBili  0.4  /  DBili  x   /  AST  112<H>  /  ALT  98<H>  /  AlkPhos  39<L>  10-12 SUBJECTIVE: No acute events overnight. +BM/flatus. Patient seen with Dr. Montague on rounds.     Vital Signs Last 24 Hrs  T(C): 36.9 (13 Oct 2018 14:00), Max: 37.5 (12 Oct 2018 22:00)  T(F): 98.5 (13 Oct 2018 14:00), Max: 99.5 (12 Oct 2018 22:00)  HR: 78 (13 Oct 2018 12:05) (72 - 100)  BP: 135/61 (13 Oct 2018 12:05) (125/63 - 166/75)  BP(mean): 88 (13 Oct 2018 12:05) (88 - 108)  RR: 18 (13 Oct 2018 12:05) (18 - 18)  SpO2: 91% (13 Oct 2018 12:05) (91% - 98%)    General: NAD, resting comfortably in bed  Pulm: Nonlabored breathing, no respiratory distress  Abd: soft, appropriately ttp, ND, incisions cdi    LABS:                        9.9    10.6  )-----------( 167      ( 13 Oct 2018 05:51 )             30.3     10-13    139  |  102  |  13  ----------------------------<  148<H>  4.4   |  25  |  0.95    Ca    8.5      13 Oct 2018 05:51  Phos  2.3     10-13  Mg     2.6     10-13    TPro  6.0  /  Alb  3.6  /  TBili  0.4  /  DBili  x   /  AST  112<H>  /  ALT  98<H>  /  AlkPhos  39<L>  10-12

## 2018-10-14 LAB
ANION GAP SERPL CALC-SCNC: 15 MMOL/L — SIGNIFICANT CHANGE UP (ref 5–17)
APPEARANCE UR: ABNORMAL
BILIRUB UR-MCNC: NEGATIVE — SIGNIFICANT CHANGE UP
BUN SERPL-MCNC: 10 MG/DL — SIGNIFICANT CHANGE UP (ref 7–23)
CALCIUM SERPL-MCNC: 8.4 MG/DL — SIGNIFICANT CHANGE UP (ref 8.4–10.5)
CHLORIDE SERPL-SCNC: 102 MMOL/L — SIGNIFICANT CHANGE UP (ref 96–108)
CO2 SERPL-SCNC: 21 MMOL/L — LOW (ref 22–31)
COLOR SPEC: YELLOW — SIGNIFICANT CHANGE UP
CREAT SERPL-MCNC: 0.69 MG/DL — SIGNIFICANT CHANGE UP (ref 0.5–1.3)
DIFF PNL FLD: ABNORMAL
GLUCOSE SERPL-MCNC: 128 MG/DL — HIGH (ref 70–99)
GLUCOSE UR QL: NEGATIVE — SIGNIFICANT CHANGE UP
HCT VFR BLD CALC: 28.7 % — LOW (ref 34.5–45)
HGB BLD-MCNC: 9.2 G/DL — LOW (ref 11.5–15.5)
KETONES UR-MCNC: NEGATIVE — SIGNIFICANT CHANGE UP
LEUKOCYTE ESTERASE UR-ACNC: NEGATIVE — SIGNIFICANT CHANGE UP
MAGNESIUM SERPL-MCNC: 2.2 MG/DL — SIGNIFICANT CHANGE UP (ref 1.6–2.6)
MCHC RBC-ENTMCNC: 30.1 PG — SIGNIFICANT CHANGE UP (ref 27–34)
MCHC RBC-ENTMCNC: 32.1 G/DL — SIGNIFICANT CHANGE UP (ref 32–36)
MCV RBC AUTO: 93.8 FL — SIGNIFICANT CHANGE UP (ref 80–100)
NITRITE UR-MCNC: NEGATIVE — SIGNIFICANT CHANGE UP
PH UR: 7.5 — SIGNIFICANT CHANGE UP (ref 5–8)
PHOSPHATE SERPL-MCNC: 1.6 MG/DL — LOW (ref 2.5–4.5)
PLATELET # BLD AUTO: 139 K/UL — LOW (ref 150–400)
POTASSIUM SERPL-MCNC: 3.5 MMOL/L — SIGNIFICANT CHANGE UP (ref 3.5–5.3)
POTASSIUM SERPL-SCNC: 3.5 MMOL/L — SIGNIFICANT CHANGE UP (ref 3.5–5.3)
PROT UR-MCNC: ABNORMAL MG/DL
RBC # BLD: 3.06 M/UL — LOW (ref 3.8–5.2)
RBC # FLD: 14.1 % — SIGNIFICANT CHANGE UP (ref 10.3–16.9)
SODIUM SERPL-SCNC: 138 MMOL/L — SIGNIFICANT CHANGE UP (ref 135–145)
SP GR SPEC: 1.01 — SIGNIFICANT CHANGE UP (ref 1–1.03)
UROBILINOGEN FLD QL: 0.2 E.U./DL — SIGNIFICANT CHANGE UP
WBC # BLD: 6.4 K/UL — SIGNIFICANT CHANGE UP (ref 3.8–10.5)
WBC # FLD AUTO: 6.4 K/UL — SIGNIFICANT CHANGE UP (ref 3.8–10.5)

## 2018-10-14 RX ORDER — POTASSIUM PHOSPHATE, MONOBASIC POTASSIUM PHOSPHATE, DIBASIC 236; 224 MG/ML; MG/ML
15 INJECTION, SOLUTION INTRAVENOUS ONCE
Qty: 0 | Refills: 0 | Status: COMPLETED | OUTPATIENT
Start: 2018-10-14 | End: 2018-10-14

## 2018-10-14 RX ORDER — HYDRALAZINE HCL 50 MG
5 TABLET ORAL ONCE
Qty: 0 | Refills: 0 | Status: COMPLETED | OUTPATIENT
Start: 2018-10-14 | End: 2018-10-14

## 2018-10-14 RX ORDER — POTASSIUM CHLORIDE 20 MEQ
40 PACKET (EA) ORAL ONCE
Qty: 0 | Refills: 0 | Status: COMPLETED | OUTPATIENT
Start: 2018-10-14 | End: 2018-10-14

## 2018-10-14 RX ADMIN — Medication 1 GRAM(S): at 01:04

## 2018-10-14 RX ADMIN — HYDROMORPHONE HYDROCHLORIDE 0.5 MILLIGRAM(S): 2 INJECTION INTRAMUSCULAR; INTRAVENOUS; SUBCUTANEOUS at 11:35

## 2018-10-14 RX ADMIN — PANTOPRAZOLE SODIUM 40 MILLIGRAM(S): 20 TABLET, DELAYED RELEASE ORAL at 06:55

## 2018-10-14 RX ADMIN — POTASSIUM PHOSPHATE, MONOBASIC POTASSIUM PHOSPHATE, DIBASIC 62.5 MILLIMOLE(S): 236; 224 INJECTION, SOLUTION INTRAVENOUS at 10:01

## 2018-10-14 RX ADMIN — HYDROMORPHONE HYDROCHLORIDE 1 MILLIGRAM(S): 2 INJECTION INTRAMUSCULAR; INTRAVENOUS; SUBCUTANEOUS at 01:25

## 2018-10-14 RX ADMIN — Medication 1 GRAM(S): at 06:55

## 2018-10-14 RX ADMIN — Medication 40 MILLIEQUIVALENT(S): at 09:45

## 2018-10-14 RX ADMIN — HEPARIN SODIUM 5000 UNIT(S): 5000 INJECTION INTRAVENOUS; SUBCUTANEOUS at 21:08

## 2018-10-14 RX ADMIN — Medication 1 GRAM(S): at 16:58

## 2018-10-14 RX ADMIN — Medication 5 MILLIGRAM(S): at 20:52

## 2018-10-14 RX ADMIN — HEPARIN SODIUM 5000 UNIT(S): 5000 INJECTION INTRAVENOUS; SUBCUTANEOUS at 13:38

## 2018-10-14 RX ADMIN — HYDROMORPHONE HYDROCHLORIDE 1 MILLIGRAM(S): 2 INJECTION INTRAMUSCULAR; INTRAVENOUS; SUBCUTANEOUS at 01:03

## 2018-10-14 RX ADMIN — AMLODIPINE BESYLATE 5 MILLIGRAM(S): 2.5 TABLET ORAL at 06:55

## 2018-10-14 RX ADMIN — Medication 1 GRAM(S): at 10:50

## 2018-10-14 NOTE — PROGRESS NOTE ADULT - SUBJECTIVE AND OBJECTIVE BOX
SUBJECTIVE: Pt seen and examined at bedside with chief. Pt denies any complaints. Pain well controlled. Tolerating diet without N/V. admits to flatus    MEDICATIONS  (STANDING):  amLODIPine   Tablet 5 milliGRAM(s) Oral daily  BUpivacaine liposome 1.3% Injectable (no eMAR) 20 milliLiter(s) Local Injection once  dextrose 5% + sodium chloride 0.45%. 1000 milliLiter(s) (100 mL/Hr) IV Continuous <Continuous>  heparin  Injectable 5000 Unit(s) SubCutaneous every 8 hours  pantoprazole    Tablet 40 milliGRAM(s) Oral before breakfast  sucralfate 1 Gram(s) Oral every 6 hours    MEDICATIONS  (PRN):  aluminum hydroxide/magnesium hydroxide/simethicone Suspension 30 milliLiter(s) Oral every 6 hours PRN Dyspepsia  HYDROmorphone  Injectable 0.5 milliGRAM(s) IV Push every 4 hours PRN Moderate Pain (4 - 6)  HYDROmorphone  Injectable 1 milliGRAM(s) IV Push every 4 hours PRN Severe Pain (7 - 10)  metoclopramide Injectable 10 milliGRAM(s) IV Push every 6 hours PRN Nausea and/or Vomiting  ondansetron Injectable 4 milliGRAM(s) IV Push every 6 hours PRN Nausea      Vital Signs Last 24 Hrs  T(C): 37.4 (14 Oct 2018 04:24), Max: 37.6 (13 Oct 2018 22:03)  T(F): 99.3 (14 Oct 2018 04:24), Max: 99.7 (13 Oct 2018 22:03)  HR: 64 (14 Oct 2018 08:38) (64 - 82)  BP: 157/66 (14 Oct 2018 08:38) (133/61 - 162/69)  BP(mean): 97 (14 Oct 2018 05:00) (88 - 99)  RR: 18 (14 Oct 2018 08:38) (16 - 18)  SpO2: 95% (14 Oct 2018 08:38) (91% - 96%)    PHYSICAL EXAM:      Constitutional: A&Ox3    Respiratory: non labored breathing, no respiratory distress    Cardiovascular: NSR, RRR    Gastrointestinal: soft ND, appropriately tender                 Incision: CDI    Genitourinary: voiding     Extremities: (-) edema                  I&O's Detail    13 Oct 2018 07:01  -  14 Oct 2018 07:00  --------------------------------------------------------  IN:    dextrose 5% + sodium chloride 0.45%.: 900 mL    lactated ringers.: 300 mL  Total IN: 1200 mL    OUT:    Voided: 1400 mL  Total OUT: 1400 mL    Total NET: -200 mL      14 Oct 2018 07:01  -  14 Oct 2018 09:27  --------------------------------------------------------  IN:  Total IN: 0 mL    OUT:    Voided: 150 mL  Total OUT: 150 mL    Total NET: -150 mL          LABS:                        9.2    6.4   )-----------( 139      ( 14 Oct 2018 06:47 )             28.7     10-14    138  |  102  |  10  ----------------------------<  128<H>  3.5   |  21<L>  |  0.69    Ca    8.4      14 Oct 2018 06:44  Phos  1.6     10-14  Mg     2.2     10-14            RADIOLOGY & ADDITIONAL STUDIES:

## 2018-10-14 NOTE — PROGRESS NOTE ADULT - ATTENDING COMMENTS
pt seen  examined by me  NAD  Afeb  VSS  pt c/o no flatus yet    denies N/V  Reinaldo clears  Abdom softly distended,  min approp tender    WBC 6   Hb 9    K 3.5--------> NEEDS REPLETION    Cont clears for now   OOB   Amb   Incent Spir

## 2018-10-14 NOTE — PROVIDER CONTACT NOTE (MEDICATION) - ASSESSMENT
Jadiel pa aware that pts urine , slightly blood tinged urine,  and pt drinking clears and tolerating clears.

## 2018-10-14 NOTE — PROVIDER CONTACT NOTE (CHANGE IN STATUS NOTIFICATION) - ASSESSMENT
randy gu made aware that pts urine output more bloody in color, it had cleared up and now appearing bloody in color again. team 4 , randy gu made aware to monitor as per md.

## 2018-10-14 NOTE — PROGRESS NOTE ADULT - ASSESSMENT
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

## 2018-10-15 LAB
ALBUMIN SERPL ELPH-MCNC: 3.5 G/DL — SIGNIFICANT CHANGE UP (ref 3.3–5)
ALP SERPL-CCNC: 46 U/L — SIGNIFICANT CHANGE UP (ref 40–120)
ALT FLD-CCNC: 38 U/L — SIGNIFICANT CHANGE UP (ref 10–45)
ANION GAP SERPL CALC-SCNC: 14 MMOL/L — SIGNIFICANT CHANGE UP (ref 5–17)
AST SERPL-CCNC: 25 U/L — SIGNIFICANT CHANGE UP (ref 10–40)
BILIRUB SERPL-MCNC: 0.5 MG/DL — SIGNIFICANT CHANGE UP (ref 0.2–1.2)
BUN SERPL-MCNC: 7 MG/DL — SIGNIFICANT CHANGE UP (ref 7–23)
CALCIUM SERPL-MCNC: 8.9 MG/DL — SIGNIFICANT CHANGE UP (ref 8.4–10.5)
CHLORIDE SERPL-SCNC: 100 MMOL/L — SIGNIFICANT CHANGE UP (ref 96–108)
CO2 SERPL-SCNC: 22 MMOL/L — SIGNIFICANT CHANGE UP (ref 22–31)
CREAT SERPL-MCNC: 0.64 MG/DL — SIGNIFICANT CHANGE UP (ref 0.5–1.3)
GLUCOSE SERPL-MCNC: 153 MG/DL — HIGH (ref 70–99)
HCT VFR BLD CALC: 29.4 % — LOW (ref 34.5–45)
HGB BLD-MCNC: 9.6 G/DL — LOW (ref 11.5–15.5)
MAGNESIUM SERPL-MCNC: 2 MG/DL — SIGNIFICANT CHANGE UP (ref 1.6–2.6)
MCHC RBC-ENTMCNC: 30 PG — SIGNIFICANT CHANGE UP (ref 27–34)
MCHC RBC-ENTMCNC: 32.7 G/DL — SIGNIFICANT CHANGE UP (ref 32–36)
MCV RBC AUTO: 91.9 FL — SIGNIFICANT CHANGE UP (ref 80–100)
PHOSPHATE SERPL-MCNC: 2 MG/DL — LOW (ref 2.5–4.5)
PLATELET # BLD AUTO: 171 K/UL — SIGNIFICANT CHANGE UP (ref 150–400)
POTASSIUM SERPL-MCNC: 3.4 MMOL/L — LOW (ref 3.5–5.3)
POTASSIUM SERPL-SCNC: 3.4 MMOL/L — LOW (ref 3.5–5.3)
PROT SERPL-MCNC: 6 G/DL — SIGNIFICANT CHANGE UP (ref 6–8.3)
RBC # BLD: 3.2 M/UL — LOW (ref 3.8–5.2)
RBC # FLD: 13.7 % — SIGNIFICANT CHANGE UP (ref 10.3–16.9)
SODIUM SERPL-SCNC: 136 MMOL/L — SIGNIFICANT CHANGE UP (ref 135–145)
WBC # BLD: 4.7 K/UL — SIGNIFICANT CHANGE UP (ref 3.8–10.5)
WBC # FLD AUTO: 4.7 K/UL — SIGNIFICANT CHANGE UP (ref 3.8–10.5)

## 2018-10-15 RX ORDER — POTASSIUM CHLORIDE 20 MEQ
10 PACKET (EA) ORAL
Qty: 0 | Refills: 0 | Status: COMPLETED | OUTPATIENT
Start: 2018-10-15 | End: 2018-10-15

## 2018-10-15 RX ORDER — HYDRALAZINE HCL 50 MG
5 TABLET ORAL ONCE
Qty: 0 | Refills: 0 | Status: COMPLETED | OUTPATIENT
Start: 2018-10-15 | End: 2018-10-15

## 2018-10-15 RX ORDER — POTASSIUM PHOSPHATE, MONOBASIC POTASSIUM PHOSPHATE, DIBASIC 236; 224 MG/ML; MG/ML
30 INJECTION, SOLUTION INTRAVENOUS ONCE
Qty: 0 | Refills: 0 | Status: COMPLETED | OUTPATIENT
Start: 2018-10-15 | End: 2018-10-15

## 2018-10-15 RX ADMIN — POTASSIUM PHOSPHATE, MONOBASIC POTASSIUM PHOSPHATE, DIBASIC 85 MILLIMOLE(S): 236; 224 INJECTION, SOLUTION INTRAVENOUS at 12:46

## 2018-10-15 RX ADMIN — Medication 100 MILLIEQUIVALENT(S): at 09:48

## 2018-10-15 RX ADMIN — Medication 1 GRAM(S): at 00:21

## 2018-10-15 RX ADMIN — HEPARIN SODIUM 5000 UNIT(S): 5000 INJECTION INTRAVENOUS; SUBCUTANEOUS at 21:21

## 2018-10-15 RX ADMIN — Medication 1 GRAM(S): at 05:13

## 2018-10-15 RX ADMIN — PANTOPRAZOLE SODIUM 40 MILLIGRAM(S): 20 TABLET, DELAYED RELEASE ORAL at 06:11

## 2018-10-15 RX ADMIN — AMLODIPINE BESYLATE 5 MILLIGRAM(S): 2.5 TABLET ORAL at 05:13

## 2018-10-15 RX ADMIN — HYDROMORPHONE HYDROCHLORIDE 1 MILLIGRAM(S): 2 INJECTION INTRAMUSCULAR; INTRAVENOUS; SUBCUTANEOUS at 07:13

## 2018-10-15 RX ADMIN — HYDROMORPHONE HYDROCHLORIDE 0.5 MILLIGRAM(S): 2 INJECTION INTRAMUSCULAR; INTRAVENOUS; SUBCUTANEOUS at 15:43

## 2018-10-15 RX ADMIN — HEPARIN SODIUM 5000 UNIT(S): 5000 INJECTION INTRAVENOUS; SUBCUTANEOUS at 05:13

## 2018-10-15 RX ADMIN — Medication 100 MILLIEQUIVALENT(S): at 12:45

## 2018-10-15 RX ADMIN — Medication 1 GRAM(S): at 19:24

## 2018-10-15 RX ADMIN — Medication 5 MILLIGRAM(S): at 00:21

## 2018-10-15 RX ADMIN — Medication 1 GRAM(S): at 14:57

## 2018-10-15 RX ADMIN — HYDROMORPHONE HYDROCHLORIDE 1 MILLIGRAM(S): 2 INJECTION INTRAMUSCULAR; INTRAVENOUS; SUBCUTANEOUS at 07:07

## 2018-10-15 RX ADMIN — HYDROMORPHONE HYDROCHLORIDE 0.5 MILLIGRAM(S): 2 INJECTION INTRAMUSCULAR; INTRAVENOUS; SUBCUTANEOUS at 15:13

## 2018-10-15 RX ADMIN — HEPARIN SODIUM 5000 UNIT(S): 5000 INJECTION INTRAVENOUS; SUBCUTANEOUS at 14:57

## 2018-10-15 NOTE — PROGRESS NOTE ADULT - ASSESSMENT
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

## 2018-10-15 NOTE — PROGRESS NOTE ADULT - SUBJECTIVE AND OBJECTIVE BOX
SUBJECTIVE: Pt seen and examined at bedside with chief. Pt denies any complaints. Pain well controlled. Tolerating diet without N/V. Admits to BF    MEDICATIONS  (STANDING):  amLODIPine   Tablet 5 milliGRAM(s) Oral daily  BUpivacaine liposome 1.3% Injectable (no eMAR) 20 milliLiter(s) Local Injection once  heparin  Injectable 5000 Unit(s) SubCutaneous every 8 hours  pantoprazole    Tablet 40 milliGRAM(s) Oral before breakfast  potassium chloride  10 mEq/100 mL IVPB 10 milliEquivalent(s) IV Intermittent every 1 hour  potassium phosphate IVPB 30 milliMole(s) IV Intermittent once  sucralfate 1 Gram(s) Oral every 6 hours    MEDICATIONS  (PRN):  aluminum hydroxide/magnesium hydroxide/simethicone Suspension 30 milliLiter(s) Oral every 6 hours PRN Dyspepsia  HYDROmorphone  Injectable 0.5 milliGRAM(s) IV Push every 4 hours PRN Moderate Pain (4 - 6)  HYDROmorphone  Injectable 1 milliGRAM(s) IV Push every 4 hours PRN Severe Pain (7 - 10)  metoclopramide Injectable 10 milliGRAM(s) IV Push every 6 hours PRN Nausea and/or Vomiting  ondansetron Injectable 4 milliGRAM(s) IV Push every 6 hours PRN Nausea      Vital Signs Last 24 Hrs  T(C): 36.3 (15 Oct 2018 09:23), Max: 37.2 (14 Oct 2018 09:59)  T(F): 97.3 (15 Oct 2018 09:23), Max: 98.9 (14 Oct 2018 09:59)  HR: 80 (15 Oct 2018 08:20) (66 - 86)  BP: 148/63 (15 Oct 2018 08:20) (145/70 - 176/80)  BP(mean): 91 (15 Oct 2018 08:20) (91 - 115)  RR: 16 (15 Oct 2018 08:20) (16 - 18)  SpO2: 98% (15 Oct 2018 08:20) (95% - 100%)    PHYSICAL EXAM:      Constitutional: A&Ox3    Respiratory: non labored breathing, no respiratory distress    Cardiovascular: NSR, RRR    Gastrointestinal: soft ND,NT                 Incision: CDI    Genitourinary: voiding    Extremities: (-) edema                  I&O's Detail    14 Oct 2018 07:01  -  15 Oct 2018 07:00  --------------------------------------------------------  IN:    Oral Fluid: 240 mL  Total IN: 240 mL    OUT:    Voided: 1350 mL  Total OUT: 1350 mL    Total NET: -1110 mL          LABS:                        9.6    4.7   )-----------( 171      ( 15 Oct 2018 06:43 )             29.4     10-15    136  |  100  |  7   ----------------------------<  153<H>  3.4<L>   |  22  |  0.64    Ca    8.9      15 Oct 2018 06:43  Phos  2.0     10-15  Mg     2.0     10-15    TPro  6.0  /  Alb  3.5  /  TBili  0.5  /  DBili  x   /  AST  25  /  ALT  38  /  AlkPhos  46  10-15      Urinalysis Basic - ( 14 Oct 2018 20:45 )    Color: Yellow / Appearance: SL Cloudy / S.015 / pH: x  Gluc: x / Ketone: NEGATIVE  / Bili: Negative / Urobili: 0.2 E.U./dL   Blood: x / Protein: Trace mg/dL / Nitrite: NEGATIVE   Leuk Esterase: NEGATIVE / RBC: Many /HPF / WBC < 5 /HPF   Sq Epi: x / Non Sq Epi: 0-5 /HPF / Bacteria: Present /HPF        RADIOLOGY & ADDITIONAL STUDIES:

## 2018-10-16 VITALS — TEMPERATURE: 99 F

## 2018-10-16 LAB
ANION GAP SERPL CALC-SCNC: 12 MMOL/L — SIGNIFICANT CHANGE UP (ref 5–17)
BUN SERPL-MCNC: 7 MG/DL — SIGNIFICANT CHANGE UP (ref 7–23)
CALCIUM SERPL-MCNC: 9.4 MG/DL — SIGNIFICANT CHANGE UP (ref 8.4–10.5)
CHLORIDE SERPL-SCNC: 102 MMOL/L — SIGNIFICANT CHANGE UP (ref 96–108)
CO2 SERPL-SCNC: 24 MMOL/L — SIGNIFICANT CHANGE UP (ref 22–31)
CREAT SERPL-MCNC: 0.76 MG/DL — SIGNIFICANT CHANGE UP (ref 0.5–1.3)
GLUCOSE SERPL-MCNC: 132 MG/DL — HIGH (ref 70–99)
HCT VFR BLD CALC: 30.5 % — LOW (ref 34.5–45)
HGB BLD-MCNC: 9.9 G/DL — LOW (ref 11.5–15.5)
MAGNESIUM SERPL-MCNC: 2.1 MG/DL — SIGNIFICANT CHANGE UP (ref 1.6–2.6)
MCHC RBC-ENTMCNC: 29.9 PG — SIGNIFICANT CHANGE UP (ref 27–34)
MCHC RBC-ENTMCNC: 32.5 G/DL — SIGNIFICANT CHANGE UP (ref 32–36)
MCV RBC AUTO: 92.1 FL — SIGNIFICANT CHANGE UP (ref 80–100)
PHOSPHATE SERPL-MCNC: 3.3 MG/DL — SIGNIFICANT CHANGE UP (ref 2.5–4.5)
PLATELET # BLD AUTO: 198 K/UL — SIGNIFICANT CHANGE UP (ref 150–400)
POTASSIUM SERPL-MCNC: 4.6 MMOL/L — SIGNIFICANT CHANGE UP (ref 3.5–5.3)
POTASSIUM SERPL-SCNC: 4.6 MMOL/L — SIGNIFICANT CHANGE UP (ref 3.5–5.3)
RBC # BLD: 3.31 M/UL — LOW (ref 3.8–5.2)
RBC # FLD: 13.6 % — SIGNIFICANT CHANGE UP (ref 10.3–16.9)
SODIUM SERPL-SCNC: 138 MMOL/L — SIGNIFICANT CHANGE UP (ref 135–145)
WBC # BLD: 5.3 K/UL — SIGNIFICANT CHANGE UP (ref 3.8–10.5)
WBC # FLD AUTO: 5.3 K/UL — SIGNIFICANT CHANGE UP (ref 3.8–10.5)

## 2018-10-16 PROCEDURE — 88321 CONSLTJ&REPRT SLD PREP ELSWR: CPT

## 2018-10-16 PROCEDURE — 90686 IIV4 VACC NO PRSV 0.5 ML IM: CPT

## 2018-10-16 RX ORDER — LABETALOL HCL 100 MG
10 TABLET ORAL ONCE
Qty: 0 | Refills: 0 | Status: COMPLETED | OUTPATIENT
Start: 2018-10-16 | End: 2018-10-16

## 2018-10-16 RX ORDER — DOCUSATE SODIUM 100 MG
1 CAPSULE ORAL
Qty: 14 | Refills: 0 | OUTPATIENT
Start: 2018-10-16

## 2018-10-16 RX ORDER — INFLUENZA VIRUS VACCINE 15; 15; 15; 15 UG/.5ML; UG/.5ML; UG/.5ML; UG/.5ML
0.5 SUSPENSION INTRAMUSCULAR ONCE
Qty: 0 | Refills: 0 | Status: COMPLETED | OUTPATIENT
Start: 2018-10-16 | End: 2018-10-16

## 2018-10-16 RX ORDER — OXYCODONE AND ACETAMINOPHEN 5; 325 MG/1; MG/1
2 TABLET ORAL EVERY 4 HOURS
Qty: 0 | Refills: 0 | Status: DISCONTINUED | OUTPATIENT
Start: 2018-10-16 | End: 2018-10-16

## 2018-10-16 RX ORDER — OXYCODONE AND ACETAMINOPHEN 5; 325 MG/1; MG/1
1 TABLET ORAL EVERY 4 HOURS
Qty: 0 | Refills: 0 | Status: DISCONTINUED | OUTPATIENT
Start: 2018-10-16 | End: 2018-10-16

## 2018-10-16 RX ADMIN — Medication 10 MILLIGRAM(S): at 09:38

## 2018-10-16 RX ADMIN — HEPARIN SODIUM 5000 UNIT(S): 5000 INJECTION INTRAVENOUS; SUBCUTANEOUS at 07:05

## 2018-10-16 RX ADMIN — OXYCODONE AND ACETAMINOPHEN 2 TABLET(S): 5; 325 TABLET ORAL at 12:48

## 2018-10-16 RX ADMIN — OXYCODONE AND ACETAMINOPHEN 2 TABLET(S): 5; 325 TABLET ORAL at 13:30

## 2018-10-16 RX ADMIN — Medication 1 GRAM(S): at 00:17

## 2018-10-16 RX ADMIN — AMLODIPINE BESYLATE 5 MILLIGRAM(S): 2.5 TABLET ORAL at 07:05

## 2018-10-16 RX ADMIN — INFLUENZA VIRUS VACCINE 0.5 MILLILITER(S): 15; 15; 15; 15 SUSPENSION INTRAMUSCULAR at 11:28

## 2018-10-16 RX ADMIN — Medication 1 GRAM(S): at 07:06

## 2018-10-16 RX ADMIN — Medication 1 GRAM(S): at 12:47

## 2018-10-16 RX ADMIN — PANTOPRAZOLE SODIUM 40 MILLIGRAM(S): 20 TABLET, DELAYED RELEASE ORAL at 07:05

## 2018-10-16 RX ADMIN — HEPARIN SODIUM 5000 UNIT(S): 5000 INJECTION INTRAVENOUS; SUBCUTANEOUS at 15:02

## 2018-10-16 NOTE — PROGRESS NOTE ADULT - SUBJECTIVE AND OBJECTIVE BOX
SUBJECTIVE: Pt seen and examined at bedside with chief. Pt denies any complaints. Pain well controlled. Tolerating diet without N/V. Admits to BF    MEDICATIONS  (STANDING):  amLODIPine   Tablet 5 milliGRAM(s) Oral daily  BUpivacaine liposome 1.3% Injectable (no eMAR) 20 milliLiter(s) Local Injection once  heparin  Injectable 5000 Unit(s) SubCutaneous every 8 hours  pantoprazole    Tablet 40 milliGRAM(s) Oral before breakfast  sucralfate 1 Gram(s) Oral every 6 hours    MEDICATIONS  (PRN):  aluminum hydroxide/magnesium hydroxide/simethicone Suspension 30 milliLiter(s) Oral every 6 hours PRN Dyspepsia  HYDROmorphone  Injectable 0.5 milliGRAM(s) IV Push every 4 hours PRN Moderate Pain (4 - 6)  HYDROmorphone  Injectable 1 milliGRAM(s) IV Push every 4 hours PRN Severe Pain (7 - 10)  metoclopramide Injectable 10 milliGRAM(s) IV Push every 6 hours PRN Nausea and/or Vomiting  ondansetron Injectable 4 milliGRAM(s) IV Push every 6 hours PRN Nausea      Vital Signs Last 24 Hrs  T(C): 36.6 (16 Oct 2018 04:50), Max: 37.2 (15 Oct 2018 17:31)  T(F): 97.9 (16 Oct 2018 04:50), Max: 99 (15 Oct 2018 17:31)  HR: 68 (16 Oct 2018 07:08) (68 - 80)  BP: 184/79 (16 Oct 2018 07:08) (140/64 - 184/79)  BP(mean): 95 (16 Oct 2018 00:19) (91 - 100)  RR: 16 (16 Oct 2018 07:08) (16 - 16)  SpO2: 95% (16 Oct 2018 07:08) (94% - 98%)    PHYSICAL EXAM:      Constitutional: A&Ox3    Respiratory: non labored breathing, no respiratory distress    Cardiovascular: NSR, RRR    Gastrointestinal: soft ND,NT                 Incision: CDI    Genitourinary: voiding     Extremities: (-) edema                  I&O's Detail    15 Oct 2018 07:01  -  16 Oct 2018 07:00  --------------------------------------------------------  IN:    Oral Fluid: 400 mL    Solution: 200 mL    Solution: 498 mL  Total IN: 1098 mL    OUT:    Voided: 1300 mL  Total OUT: 1300 mL    Total NET: -202 mL          LABS:                        9.9    5.3   )-----------( 198      ( 16 Oct 2018 06:02 )             30.5     10-16    138  |  102  |  7   ----------------------------<  132<H>  4.6   |  24  |  0.76    Ca    9.4      16 Oct 2018 06:02  Phos  3.3     10-16  Mg     2.1     10-16    TPro  6.0  /  Alb  3.5  /  TBili  0.5  /  DBili  x   /  AST  25  /  ALT  38  /  AlkPhos  46  10-15      Urinalysis Basic - ( 14 Oct 2018 20:45 )    Color: Yellow / Appearance: SL Cloudy / S.015 / pH: x  Gluc: x / Ketone: NEGATIVE  / Bili: Negative / Urobili: 0.2 E.U./dL   Blood: x / Protein: Trace mg/dL / Nitrite: NEGATIVE   Leuk Esterase: NEGATIVE / RBC: Many /HPF / WBC < 5 /HPF   Sq Epi: x / Non Sq Epi: 0-5 /HPF / Bacteria: Present /HPF        RADIOLOGY & ADDITIONAL STUDIES:

## 2018-10-16 NOTE — PROGRESS NOTE ADULT - ASSESSMENT
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

## 2018-10-17 LAB
-  AMPICILLIN/SULBACTAM: SIGNIFICANT CHANGE UP
-  AMPICILLIN/SULBACTAM: SIGNIFICANT CHANGE UP
-  AMPICILLIN: SIGNIFICANT CHANGE UP
-  AMPICILLIN: SIGNIFICANT CHANGE UP
-  CEFAZOLIN: SIGNIFICANT CHANGE UP
-  CEFAZOLIN: SIGNIFICANT CHANGE UP
-  CEFTRIAXONE: SIGNIFICANT CHANGE UP
-  CEFTRIAXONE: SIGNIFICANT CHANGE UP
-  CIPROFLOXACIN: SIGNIFICANT CHANGE UP
-  CIPROFLOXACIN: SIGNIFICANT CHANGE UP
-  GENTAMICIN: SIGNIFICANT CHANGE UP
-  GENTAMICIN: SIGNIFICANT CHANGE UP
-  NITROFURANTOIN: SIGNIFICANT CHANGE UP
-  NITROFURANTOIN: SIGNIFICANT CHANGE UP
-  PIPERACILLIN/TAZOBACTAM: SIGNIFICANT CHANGE UP
-  PIPERACILLIN/TAZOBACTAM: SIGNIFICANT CHANGE UP
-  TOBRAMYCIN: SIGNIFICANT CHANGE UP
-  TOBRAMYCIN: SIGNIFICANT CHANGE UP
-  TRIMETHOPRIM/SULFAMETHOXAZOLE: SIGNIFICANT CHANGE UP
-  TRIMETHOPRIM/SULFAMETHOXAZOLE: SIGNIFICANT CHANGE UP
CULTURE RESULTS: SIGNIFICANT CHANGE UP
METHOD TYPE: SIGNIFICANT CHANGE UP
METHOD TYPE: SIGNIFICANT CHANGE UP
ORGANISM # SPEC MICROSCOPIC CNT: SIGNIFICANT CHANGE UP
SPECIMEN SOURCE: SIGNIFICANT CHANGE UP

## 2018-10-18 LAB — SURGICAL PATHOLOGY STUDY: SIGNIFICANT CHANGE UP

## 2018-11-11 PROCEDURE — 82803 BLOOD GASES ANY COMBINATION: CPT

## 2018-11-11 PROCEDURE — 83735 ASSAY OF MAGNESIUM: CPT

## 2018-11-11 PROCEDURE — 88305 TISSUE EXAM BY PATHOLOGIST: CPT

## 2018-11-11 PROCEDURE — 80053 COMPREHEN METABOLIC PANEL: CPT

## 2018-11-11 PROCEDURE — 86900 BLOOD TYPING SEROLOGIC ABO: CPT

## 2018-11-11 PROCEDURE — C1765: CPT

## 2018-11-11 PROCEDURE — 90686 IIV4 VACC NO PRSV 0.5 ML IM: CPT

## 2018-11-11 PROCEDURE — 87186 SC STD MICRODIL/AGAR DIL: CPT

## 2018-11-11 PROCEDURE — 86901 BLOOD TYPING SEROLOGIC RH(D): CPT

## 2018-11-11 PROCEDURE — C1889: CPT

## 2018-11-11 PROCEDURE — 87086 URINE CULTURE/COLONY COUNT: CPT

## 2018-11-11 PROCEDURE — 84132 ASSAY OF SERUM POTASSIUM: CPT

## 2018-11-11 PROCEDURE — 84295 ASSAY OF SERUM SODIUM: CPT

## 2018-11-11 PROCEDURE — 82330 ASSAY OF CALCIUM: CPT

## 2018-11-11 PROCEDURE — 88331 PATH CONSLTJ SURG 1 BLK 1SPC: CPT

## 2018-11-11 PROCEDURE — 84100 ASSAY OF PHOSPHORUS: CPT

## 2018-11-11 PROCEDURE — 80048 BASIC METABOLIC PNL TOTAL CA: CPT

## 2018-11-11 PROCEDURE — 36415 COLL VENOUS BLD VENIPUNCTURE: CPT

## 2018-11-11 PROCEDURE — 88304 TISSUE EXAM BY PATHOLOGIST: CPT

## 2018-11-11 PROCEDURE — 82962 GLUCOSE BLOOD TEST: CPT

## 2018-11-11 PROCEDURE — 85018 HEMOGLOBIN: CPT

## 2018-11-11 PROCEDURE — 81001 URINALYSIS AUTO W/SCOPE: CPT

## 2018-11-11 PROCEDURE — 85027 COMPLETE CBC AUTOMATED: CPT

## 2018-11-11 PROCEDURE — 86850 RBC ANTIBODY SCREEN: CPT

## 2018-11-11 PROCEDURE — 88309 TISSUE EXAM BY PATHOLOGIST: CPT

## 2018-11-28 PROBLEM — I10 ESSENTIAL (PRIMARY) HYPERTENSION: Chronic | Status: ACTIVE | Noted: 2018-10-10

## 2018-11-28 PROBLEM — F41.9 ANXIETY DISORDER, UNSPECIFIED: Chronic | Status: ACTIVE | Noted: 2018-10-10

## 2018-11-28 PROBLEM — C49.9 MALIGNANT NEOPLASM OF CONNECTIVE AND SOFT TISSUE, UNSPECIFIED: Chronic | Status: ACTIVE | Noted: 2018-10-12

## 2018-11-28 PROBLEM — E78.5 HYPERLIPIDEMIA, UNSPECIFIED: Chronic | Status: ACTIVE | Noted: 2018-10-10

## 2018-11-28 PROBLEM — E78.00 PURE HYPERCHOLESTEROLEMIA, UNSPECIFIED: Chronic | Status: ACTIVE | Noted: 2018-10-12

## 2018-12-19 ENCOUNTER — OUTPATIENT (OUTPATIENT)
Dept: OUTPATIENT SERVICES | Facility: HOSPITAL | Age: 78
LOS: 1 days | Discharge: HOME | End: 2018-12-19

## 2018-12-19 DIAGNOSIS — Z90.49 ACQUIRED ABSENCE OF OTHER SPECIFIED PARTS OF DIGESTIVE TRACT: Chronic | ICD-10-CM

## 2018-12-19 DIAGNOSIS — Z98.891 HISTORY OF UTERINE SCAR FROM PREVIOUS SURGERY: Chronic | ICD-10-CM

## 2018-12-19 DIAGNOSIS — Z41.9 ENCOUNTER FOR PROCEDURE FOR PURPOSES OTHER THAN REMEDYING HEALTH STATE, UNSPECIFIED: Chronic | ICD-10-CM

## 2018-12-19 DIAGNOSIS — C78.6 SECONDARY MALIGNANT NEOPLASM OF RETROPERITONEUM AND PERITONEUM: ICD-10-CM

## 2019-01-03 ENCOUNTER — APPOINTMENT (OUTPATIENT)
Dept: HEMATOLOGY ONCOLOGY | Facility: CLINIC | Age: 79
End: 2019-01-03
Payer: MEDICARE

## 2019-01-03 VITALS
WEIGHT: 132.28 LBS | HEART RATE: 78 BPM | RESPIRATION RATE: 16 BRPM | SYSTOLIC BLOOD PRESSURE: 175 MMHG | OXYGEN SATURATION: 98 % | BODY MASS INDEX: 24.19 KG/M2 | TEMPERATURE: 98.5 F | DIASTOLIC BLOOD PRESSURE: 74 MMHG

## 2019-01-03 DIAGNOSIS — E78.5 HYPERLIPIDEMIA, UNSPECIFIED: ICD-10-CM

## 2019-01-03 PROCEDURE — 99215 OFFICE O/P EST HI 40 MIN: CPT

## 2019-01-03 RX ORDER — METFORMIN HYDROCHLORIDE 500 MG/1
500 TABLET, COATED ORAL
Refills: 0 | Status: ACTIVE | COMMUNITY
Start: 2019-01-03

## 2019-01-03 RX ORDER — ASPIRIN ENTERIC COATED TABLETS 81 MG 81 MG/1
81 TABLET, DELAYED RELEASE ORAL
Refills: 0 | Status: DISCONTINUED | COMMUNITY
End: 2019-01-03

## 2019-07-05 ENCOUNTER — OUTPATIENT (OUTPATIENT)
Dept: OUTPATIENT SERVICES | Facility: HOSPITAL | Age: 79
LOS: 1 days | Discharge: HOME | End: 2019-07-05

## 2019-07-05 DIAGNOSIS — Z41.9 ENCOUNTER FOR PROCEDURE FOR PURPOSES OTHER THAN REMEDYING HEALTH STATE, UNSPECIFIED: Chronic | ICD-10-CM

## 2019-07-05 DIAGNOSIS — Z98.891 HISTORY OF UTERINE SCAR FROM PREVIOUS SURGERY: Chronic | ICD-10-CM

## 2019-07-05 DIAGNOSIS — C78.6 SECONDARY MALIGNANT NEOPLASM OF RETROPERITONEUM AND PERITONEUM: ICD-10-CM

## 2019-07-05 DIAGNOSIS — Z90.49 ACQUIRED ABSENCE OF OTHER SPECIFIED PARTS OF DIGESTIVE TRACT: Chronic | ICD-10-CM

## 2019-07-10 ENCOUNTER — APPOINTMENT (OUTPATIENT)
Dept: HEMATOLOGY ONCOLOGY | Facility: CLINIC | Age: 79
End: 2019-07-10
Payer: MEDICARE

## 2019-07-10 VITALS
SYSTOLIC BLOOD PRESSURE: 170 MMHG | BODY MASS INDEX: 24.4 KG/M2 | OXYGEN SATURATION: 97 % | WEIGHT: 133.38 LBS | DIASTOLIC BLOOD PRESSURE: 76 MMHG | HEART RATE: 69 BPM | TEMPERATURE: 98.4 F | RESPIRATION RATE: 18 BRPM

## 2019-07-10 DIAGNOSIS — G89.3 NEOPLASM RELATED PAIN (ACUTE) (CHRONIC): ICD-10-CM

## 2019-07-10 DIAGNOSIS — Z87.19 PERSONAL HISTORY OF OTHER DISEASES OF THE DIGESTIVE SYSTEM: ICD-10-CM

## 2019-07-10 PROCEDURE — 99215 OFFICE O/P EST HI 40 MIN: CPT

## 2019-09-18 NOTE — HISTORY OF PRESENT ILLNESS
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 She had positive margin and after review at [facility] tumor board, the plan was for close surveillance and not adjuvant therapy. \par \par [id number]: She did well - she had lost a lot of weight before diagnosis most of which she regained and has kept on: She gained 30 lbs. She has no fatigue, lethargy or pains. She is also being followed for lung nodule vs scar which was read as stable stable.\par \par 12/08/2017: Dr Salomon reviewed the MRI and saw new lesions within the psoas muscle which is larger than the other side; should be smaller post-op. Final report showed peritoneal mass right sided, near transverse colon, consistent with metastatic disease, 4.2 x 3.3 cm.  ALso with RIGHT nephrectomy seen, enhancing lesion at level of L3 spinous process, ? recurrent disease, dumbbell shape, 2.9 x 2.2 cm, through retroperitoneum into right paraspinal musculature. \par \par 12/28/2017: Seen for advice, opinion on further management. Some pain at a drain site in the posterior upper abdomen, otherwise feeling well. \par \par 3/26/18: Here after a new staging scan showing minimal change in intraperitoneal lesion near colon; retroperitoneal mass penetrating paraspinal muscle at L3 is unchanged. She still notes persistent, intermittent RLQ abdominal pain, relieved by bowel movements. No diarrhea, no blood in stool.\par \par 6/11/18 : Here on an urgent basis. Has been having throbbing pain in the mid to RLQ abd for the last 2 weeks. Only after she eats and solid foods. Energy is fair but fatigued at times. She is concerned that her lesions are growing rapidly in the abd. She had scheduled an appt with Dr. Guerra on 6/28/18. Admitted and Rx for diverticulitis. \par \par 06/28/2018: She is at the end of her Abx for her Rx of diverticulitis and still feels very poorly, with pelvic > abdominal discomfort. The scans were not terribly impressive with respect to tumor bulk; it is not clear if there is an admixture of tumor and inflammation at the site of the diverticulitis. Decreased oral intake but no N/V or other obstructive symptoms per se. \par \par 09/06/2018: She notes increased weight/heaviness benny after meals; imaging shows tumor worse along asc colon, perhaps accounting for these symptoms. There is also a mass involving psoas paraspinal muscles at L3 or so that is slightly larger, but not presently causing symptoms. She is about to go to [facility] for a trip and had a good time on a cruise to the [facility] over the summer. \par \par 10/11/18 : Resection of liposarcoma recurrence, R colon with retroperitoneal mass; hemicolectomy at [facility], deiff liposarcoma, 8.5 cm in largest dimension, involving retroperitoneum and colonic serosa to submucosa, margins negative. Perihepatic nodule and mesenteric  bx was positive for dediff liposarcoma.\par \par 12/17/18 Post operative CT Abd/Pelvis stable post operative scans\par \par 1/3/19 : Here for a follow up. Recovered fully form surgery. Diabetes, now on metformin 500 mg daily. Anxiety related HTN managed by her PCP, only with MD visits. \par She feels well in general. \par \par 07/10/2019 : New set of scans ordered on 6/20/19. She is asymptomatic despite the recurrence save for some gassiness / gurgling she can hear in her abdomen. \par \par She is now seen for advice, opinion on further management.

## 2019-09-18 NOTE — REASON FOR VISIT
[Follow-Up Visit] : a follow-up [Family Member] : family member [FreeTextEntry2] : 79 F with WDDDLS (well diff / dediff liposarcoma) of kidney, post nephrectomy, with peritoneal metastatic disease despite prior surgeries

## 2019-09-18 NOTE — ASSESSMENT
[Palliative] : Goals of care discussed with patient: Palliative [Palliative Care Plan] : not applicable at this time [FreeTextEntry1] : Complex issue for this 79 F not resectable for cure from retroperitoneal WDDDLS. RT, systemic therapy and surgery all can provide palliation for her. \par \par WDDDLS: Had resection (including hemicolectomy) through Dr Kuo at [facility], recovered fully. \par \par Doxil or a CDK4-6 inhibitor may be an option for 2 lines of Rx followed by drugs like trabectedin or eribulin. All have a < 10% chance of RECIST response, and stable disease is most common "best" outcome. \par \par However, she is not keen on systemic therapy, understandably with the low response rate\par \par Suggest no operation right now as she is asymptomatic and from the issue that each operation is harder and ultimately shorter time between surgeries to the point of unresectability some day. \par \par She will follow up with us in late 09/2019 with new imaging of abd pelvis. \par \par Her sarcoma diagnosis does not materially impact her other medical diagnoses at present.\par \par MD only visit today

## 2019-09-18 NOTE — PHYSICAL EXAM
[Restricted in physically strenuous activity but ambulatory and able to carry out work of a light or sedentary nature] : Status 1- Restricted in physically strenuous activity but ambulatory and able to carry out work of a light or sedentary nature, e.g., light house work, office work [Normal] : affect appropriate [de-identified] : Healed surgical changes over flank consistent with prior operation; + bowel sounds, diffuse mild tenderness/sensitivity

## 2019-09-18 NOTE — RESULTS/DATA
[FreeTextEntry1] : CT 09/04/2018 shows disease worse, now perhaps 6 cm in lesion near colon, though hard to measure well without contrast. The lesion near L3 is about the same size but about 5.5 cm - final report pending. This was my own review using my eyes and a caliper\par \par CT 12/2018 :Post operative (hemicolectomy) appears stable after sx on our review. \par \par CT 06/2019 [facility] CT AP: multiple sites of disease recurrence. We saw the images, but she took the disc with her to share with Dr Kuo her surgeon

## 2019-09-18 NOTE — CONSULT LETTER
[Dear  ___] : Dear ~RAVEN, [Courtesy Letter:] : I had the pleasure of seeing your patient, [unfilled], in my office today. [Sincerely,] : Sincerely, [Please see my note below.] : Please see my note below. [Consult Closing:] : Thank you very much for allowing me to participate in the care of this patient.  If you have any questions, please do not hesitate to contact me. [FreeTextEntry2] : Madelaine Jolly MD, [address] [facility] floor, [place] [address],  [phone]  FAX  [phone]\par Eliel Kuo MD, [facility]; [address] , [place] [address]  TEL: [phone] [FreeTextEntry1] : She has multiple sites of recurrence at this point, but does not want chemotherapy. It is not a bad choice, as only 10% or fewer people will respond, and she is asymptomatic at the moment. We'll see her in follow up in Late September 2019 with new imaging. We did not suggest an operation either, unless the tumors are larger and she is more symptomatic. \par  [FreeTextEntry3] : DARA Guerra\[facility]

## 2019-09-23 ENCOUNTER — OUTPATIENT (OUTPATIENT)
Dept: OUTPATIENT SERVICES | Facility: HOSPITAL | Age: 79
LOS: 1 days | Discharge: HOME | End: 2019-09-23

## 2019-09-23 DIAGNOSIS — Z90.49 ACQUIRED ABSENCE OF OTHER SPECIFIED PARTS OF DIGESTIVE TRACT: Chronic | ICD-10-CM

## 2019-09-23 DIAGNOSIS — Z98.891 HISTORY OF UTERINE SCAR FROM PREVIOUS SURGERY: Chronic | ICD-10-CM

## 2019-09-23 DIAGNOSIS — Z41.9 ENCOUNTER FOR PROCEDURE FOR PURPOSES OTHER THAN REMEDYING HEALTH STATE, UNSPECIFIED: Chronic | ICD-10-CM

## 2019-09-23 DIAGNOSIS — C78.6 SECONDARY MALIGNANT NEOPLASM OF RETROPERITONEUM AND PERITONEUM: ICD-10-CM

## 2019-09-25 ENCOUNTER — APPOINTMENT (OUTPATIENT)
Dept: HEMATOLOGY ONCOLOGY | Facility: CLINIC | Age: 79
End: 2019-09-25
Payer: MEDICARE

## 2019-09-25 VITALS
OXYGEN SATURATION: 97 % | DIASTOLIC BLOOD PRESSURE: 77 MMHG | TEMPERATURE: 98.4 F | RESPIRATION RATE: 16 BRPM | SYSTOLIC BLOOD PRESSURE: 150 MMHG | HEART RATE: 81 BPM | WEIGHT: 125.22 LBS | BODY MASS INDEX: 22.9 KG/M2

## 2019-09-25 PROCEDURE — 99214 OFFICE O/P EST MOD 30 MIN: CPT

## 2019-09-25 NOTE — HISTORY OF PRESENT ILLNESS
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 She had positive margin and after review at [facility] tumor board, the plan was for close surveillance and not adjuvant therapy. \par \par [id number]: She did well - she had lost a lot of weight before diagnosis most of which she regained and has kept on: She gained 30 lbs. She has no fatigue, lethargy or pains. She is also being followed for lung nodule vs scar which was read as stable stable.\par \par 12/08/2017: Dr Salomon reviewed the MRI and saw new lesions within the psoas muscle which is larger than the other side; should be smaller post-op. Final report showed peritoneal mass right sided, near transverse colon, consistent with metastatic disease, 4.2 x 3.3 cm.  ALso with RIGHT nephrectomy seen, enhancing lesion at level of L3 spinous process, ? recurrent disease, dumbbell shape, 2.9 x 2.2 cm, through retroperitoneum into right paraspinal musculature. \par \par 12/28/2017: Seen for advice, opinion on further management. Some pain at a drain site in the posterior upper abdomen, otherwise feeling well. \par \par 3/26/18: Here after a new staging scan showing minimal change in intraperitoneal lesion near colon; retroperitoneal mass penetrating paraspinal muscle at L3 is unchanged. She still notes persistent, intermittent RLQ abdominal pain, relieved by bowel movements. No diarrhea, no blood in stool.\par \par 6/11/18 : Here on an urgent basis. Has been having throbbing pain in the mid to RLQ abd for the last 2 weeks. Only after she eats and solid foods. Energy is fair but fatigued at times. She is concerned that her lesions are growing rapidly in the abd. She had scheduled an appt with Dr. Guerra on 6/28/18. Admitted and Rx for diverticulitis. \par \par 06/28/2018: She is at the end of her Abx for her Rx of diverticulitis and still feels very poorly, with pelvic > abdominal discomfort. The scans were not terribly impressive with respect to tumor bulk; it is not clear if there is an admixture of tumor and inflammation at the site of the diverticulitis. Decreased oral intake but no N/V or other obstructive symptoms per se. \par \par 09/06/2018: She notes increased weight/heaviness benny after meals; imaging shows tumor worse along asc colon, perhaps accounting for these symptoms. There is also a mass involving psoas paraspinal muscles at L3 or so that is slightly larger, but not presently causing symptoms. She is about to go to [facility] for a trip and had a good time on a cruise to the [facility] over the summer. \par \par 10/11/18 : Resection of liposarcoma recurrence, R colon with retroperitoneal mass; hemicolectomy at [facility], deiff liposarcoma, 8.5 cm in largest dimension, involving retroperitoneum and colonic serosa to submucosa, margins negative. Perihepatic nodule and mesenteric  bx was positive for dediff liposarcoma.\par \par 12/17/18 Post operative CT Abd/Pelvis stable post operative scans\par \par 1/3/19 : Here for a follow up. Recovered fully form surgery. Diabetes, now on metformin 500 mg daily. Anxiety related HTN managed by her PCP, only with MD visits. \par She feels well in general. \par \par 07/10/2019 : New set of scans ordered on 6/20/19. She is asymptomatic despite the recurrence save for some gassiness / gurgling she can hear in her abdomen. \par \par 9/9/19 : [facility] CT and/pelvis : Recurrent/metastatic liposarcoma (3.x x 5.7 x 7 cm  previously 3.9 x 3.4 x 4.5 x) of the R nephrectomy fossa with posterior invasion of the erector spinae muscle, lateral extension inferior to the liver, superior extension to involve the diaphragmatic elle and inferior extension into the retroperitoneum is without change since 6/2019. \par \par 9/25/19 : Here for a follow up. CT showing mild worsening  in one site, continues to be asymptomatic. \par \par She is now seen for advice, opinion on further management.

## 2019-09-25 NOTE — RESULTS/DATA
[FreeTextEntry1] : CT 09/04/2018 shows disease worse, now perhaps 6 cm in lesion near colon, though hard to measure well without contrast. The lesion near L3 is about the same size but about 5.5 cm - final report pending. This was my own review using my eyes and a caliper\par \par CT 12/2018 :Post operative (hemicolectomy) appears stable after sx on our review. \par \par CT 06/2019 [facility] CT AP: multiple sites of disease recurrence. We saw the images, but she took the disc with her to share with Dr Kuo her surgeon\par \par CT 9/9/19 : [facility] CT and/pelvis : Recurrent/metastatic liposarcoma (3. x 5.7 x 7 cm ) of the R nephrectomy fossa with posterior invasion of the erector spinae muscle, lateral extension inferior to the liver, superior extension to involve the diaphragmatic elle and inferior extension into the retroperitoneum is without change since 6/2019.

## 2019-09-25 NOTE — PHYSICAL EXAM
[Restricted in physically strenuous activity but ambulatory and able to carry out work of a light or sedentary nature] : Status 1- Restricted in physically strenuous activity but ambulatory and able to carry out work of a light or sedentary nature, e.g., light house work, office work [Normal] : affect appropriate [de-identified] : Healed surgical changes over flank consistent with prior operation; + bowel sounds, diffuse mild tenderness/sensitivity

## 2019-09-25 NOTE — CONSULT LETTER
[Dear  ___] : Dear ~RAVEN, [Courtesy Letter:] : I had the pleasure of seeing your patient, [unfilled], in my office today. [Please see my note below.] : Please see my note below. [Consult Closing:] : Thank you very much for allowing me to participate in the care of this patient.  If you have any questions, please do not hesitate to contact me. [Sincerely,] : Sincerely, [FreeTextEntry2] : Madelaine Jolly MD, [address] [facility] floor, [place] [address],  [phone]  FAX  [phone]\par Eliel Kuo MD, [facility]; [address] , [place] [address]  TEL: [phone] [FreeTextEntry3] : DARA Guerra\[facility] [FreeTextEntry1] : She has multiple sites of recurrence at this point, but does not want chemotherapy. It is not a bad choice, as only 10% or fewer people will respond, and she is asymptomatic at the moment. We'll see her in follow up in Late 01/2020 with new imaging. We did not suggest an operation either, unless the tumors are larger and she is more symptomatic. She will have follow up with Dr Kuo in 02/2020 after these scans as well. \par

## 2019-09-25 NOTE — END OF VISIT
[>50% of Time Spent on Counseling and Coordination of Care for  ___] : Greater than 50% of the encounter time was spent on counseling and coordination of care for [unfilled] [FreeTextEntry3] : MD visit only today [Time Spent: ___ minutes] : I have spent [unfilled] minutes of face to face time with the patient

## 2019-09-25 NOTE — ASSESSMENT
[Palliative] : Goals of care discussed with patient: Palliative [Palliative Care Plan] : not applicable at this time [FreeTextEntry1] : Complex issue for this 79 F not resectable for cure from retroperitoneal WDDDLS. RT, systemic therapy and surgery all can provide palliation for her. \par \par WDDDLS: Had resection (including hemicolectomy) through Dr Kuo at [facility], recovered fully. She has follow up with him in 02/2020\par \par She is not keen on systemic therapy, understandably with the low response rate\par \par I still suggest no operation right now as she is asymptomatic and from the issue that each operation is harder and ultimately shorter time between surgeries to the point of unresectability some day. \par \par Doxil or a CDK4-6 inhibitor may be an option for 2 lines of Rx followed by drugs like trabectedin or eribulin. All have a < 10% chance of RECIST response, and stable disease is most common "best" outcome. \par \par She will follow up with us in late 01/2020 with new imaging of abd pelvis and will see Dr Kuo after that. \par \par Her sarcoma diagnosis does not materially impact her other medical diagnoses at present.\par \par MD only visit today

## 2019-12-12 ENCOUNTER — OUTPATIENT (OUTPATIENT)
Dept: OUTPATIENT SERVICES | Facility: HOSPITAL | Age: 79
LOS: 1 days | Discharge: HOME | End: 2019-12-12

## 2019-12-12 DIAGNOSIS — Z90.49 ACQUIRED ABSENCE OF OTHER SPECIFIED PARTS OF DIGESTIVE TRACT: Chronic | ICD-10-CM

## 2019-12-12 DIAGNOSIS — Z41.9 ENCOUNTER FOR PROCEDURE FOR PURPOSES OTHER THAN REMEDYING HEALTH STATE, UNSPECIFIED: Chronic | ICD-10-CM

## 2019-12-12 DIAGNOSIS — C78.6 SECONDARY MALIGNANT NEOPLASM OF RETROPERITONEUM AND PERITONEUM: ICD-10-CM

## 2019-12-12 DIAGNOSIS — Z98.891 HISTORY OF UTERINE SCAR FROM PREVIOUS SURGERY: Chronic | ICD-10-CM

## 2020-01-07 ENCOUNTER — APPOINTMENT (OUTPATIENT)
Dept: HEMATOLOGY ONCOLOGY | Facility: CLINIC | Age: 80
End: 2020-01-07
Payer: MEDICARE

## 2020-01-07 VITALS
RESPIRATION RATE: 14 BRPM | BODY MASS INDEX: 23.71 KG/M2 | SYSTOLIC BLOOD PRESSURE: 179 MMHG | TEMPERATURE: 99 F | OXYGEN SATURATION: 96 % | HEART RATE: 81 BPM | DIASTOLIC BLOOD PRESSURE: 74 MMHG | WEIGHT: 129.63 LBS

## 2020-01-07 PROCEDURE — 99215 OFFICE O/P EST HI 40 MIN: CPT

## 2020-01-07 NOTE — REASON FOR VISIT
[Follow-Up Visit] : a follow-up [Family Member] : family member [Other: _____] : [unfilled] [FreeTextEntry2] : 79 F with WDDDLS (well diff / dediff liposarcoma) of kidney, post nephrectomy, with peritoneal metastatic disease despite prior surgeries

## 2020-01-07 NOTE — CONSULT LETTER
[Dear  ___] : Dear ~RAVEN, [Please see my note below.] : Please see my note below. [Courtesy Letter:] : I had the pleasure of seeing your patient, [unfilled], in my office today. [Consult Closing:] : Thank you very much for allowing me to participate in the care of this patient.  If you have any questions, please do not hesitate to contact me. [Sincerely,] : Sincerely, [FreeTextEntry2] : Madelaine Jolly MD, [address] [facility] floor, [place] [address],  [phone]  FAX  [phone]\par Eliel Kuo MD, [facility]; [address] , [place] [address]  TEL: [phone] [FreeTextEntry1] : She has multiple sites of recurrence at this point, but does not want chemotherapy. It is not a bad choice, as only 10% or fewer people will respond, and she is asymptomatic at the moment. We'll see her in follow up in Late 01/2020 with new imaging. We did not suggest an operation either, unless the tumors are larger and she is more symptomatic. She will have follow up with Dr Kuo in 02/2020 after these scans as well. \par  [FreeTextEntry3] : DARA Guerra\[facility]

## 2020-01-07 NOTE — PHYSICAL EXAM
[Restricted in physically strenuous activity but ambulatory and able to carry out work of a light or sedentary nature] : Status 1- Restricted in physically strenuous activity but ambulatory and able to carry out work of a light or sedentary nature, e.g., light house work, office work [Normal] : normal appearance, no rash, nodules, vesicles, ulcers, erythema [de-identified] : Healed surgical changes over flank consistent with prior operation; + bowel sounds, diffuse mild tenderness/sensitivity

## 2020-01-07 NOTE — HISTORY OF PRESENT ILLNESS
[Disease: _____________________] : Disease: [unfilled] [T: ___] : T[unfilled] [M: ___] : M[unfilled] [AJCC Stage: ____] : AJCC Stage: [unfilled] [N: ___] : N[unfilled] [Date: ____________] : Patient's last distress assessment performed on [unfilled]. 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 She had positive margin and after review at [facility] tumor board, the plan was for close surveillance and not adjuvant therapy. \par \par [id number]: She did well - she had lost a lot of weight before diagnosis most of which she regained and has kept on: She gained 30 lbs. She has no fatigue, lethargy or pains. She is also being followed for lung nodule vs scar which was read as stable stable.\par \par 12/08/2017: Dr Salomon reviewed the MRI and saw new lesions within the psoas muscle which is larger than the other side; should be smaller post-op. Final report showed peritoneal mass right sided, near transverse colon, consistent with metastatic disease, 4.2 x 3.3 cm.  ALso with RIGHT nephrectomy seen, enhancing lesion at level of L3 spinous process, ? recurrent disease, dumbbell shape, 2.9 x 2.2 cm, through retroperitoneum into right paraspinal musculature. \par \par 12/28/2017: Seen for advice, opinion on further management. Some pain at a drain site in the posterior upper abdomen, otherwise feeling well. \par \par 3/26/18: Here after a new staging scan showing minimal change in intraperitoneal lesion near colon; retroperitoneal mass penetrating paraspinal muscle at L3 is unchanged. She still notes persistent, intermittent RLQ abdominal pain, relieved by bowel movements. No diarrhea, no blood in stool.\par \par 6/11/18 : Here on an urgent basis. Has been having throbbing pain in the mid to RLQ abd for the last 2 weeks. Only after she eats and solid foods. Energy is fair but fatigued at times. She is concerned that her lesions are growing rapidly in the abd. She had scheduled an appt with Dr. Guerra on 6/28/18. Admitted and Rx for diverticulitis. \par \par 06/28/2018: She is at the end of her Abx for her Rx of diverticulitis and still feels very poorly, with pelvic > abdominal discomfort. The scans were not terribly impressive with respect to tumor bulk; it is not clear if there is an admixture of tumor and inflammation at the site of the diverticulitis. Decreased oral intake but no N/V or other obstructive symptoms per se. \par \par 09/06/2018: She notes increased weight/heaviness benny after meals; imaging shows tumor worse along asc colon, perhaps accounting for these symptoms. There is also a mass involving psoas paraspinal muscles at L3 or so that is slightly larger, but not presently causing symptoms. She is about to go to [facility] for a trip and had a good time on a cruise to [place] [facility] over the summer. \par \par 10/11/18 : Resection of liposarcoma recurrence, R colon with retroperitoneal mass; hemicolectomy at [facility], deiff liposarcoma, 8.5 cm in largest dimension, involving retroperitoneum and colonic serosa to submucosa, margins negative. Perihepatic nodule and mesenteric  bx was positive for dediff liposarcoma.\par \par 12/17/18 Post operative CT Abd/Pelvis stable post operative scans\par \par 1/3/19 : Here for a follow up. Recovered fully form surgery. Diabetes, now on metformin 500 mg daily. Anxiety related HTN managed by her PCP, only with MD visits. \par She feels well in general. \par \par 07/10/2019 : New set of scans ordered on 6/20/19. She is asymptomatic despite the recurrence save for some gassiness / gurgling she can hear in her abdomen. \par \par 9/9/19 : [facility] CT and/pelvis : Recurrent/metastatic liposarcoma (3.x x 5.7 x 7 cm  previously 3.9 x 3.4 x 4.5 x) of the R nephrectomy fossa with posterior invasion of the erector spinae muscle, lateral extension inferior to the liver, superior extension to involve the diaphragmatic elle and inferior extension into the retroperitoneum is without change since 6/2019. \par \par 9/25/19 : Here for a follow up. CT showing mild worsening  in one site, continues to be asymptomatic. \par \par She is now seen for advice, opinion on further management.  [FreeTextEntry1] : observation [de-identified] : see ABOVE\par \par

## 2020-01-07 NOTE — REVIEW OF SYSTEMS
[Difficulty Walking] : difficulty walking [Negative] : Gastrointestinal [Abdominal Pain] : no abdominal pain [Vomiting] : no vomiting [Constipation] : no constipation [Diarrhea] : no diarrhea [Confused] : no confusion [Fainting] : no fainting [FreeTextEntry7] : see HPI

## 2020-01-07 NOTE — RESULTS/DATA
[FreeTextEntry1] : CT 09/04/2018 shows disease worse, now perhaps 6 cm in lesion near colon, though hard to measure well without contrast. The lesion near L3 is about the same size but about 5.5 cm - final report pending. This was my own review using my eyes and a caliper\par \par CT 12/2018 :Post operative (hemicolectomy) appears stable after sx on our review. \par \par CT 06/2019 [facility] CT AP: multiple sites of disease recurrence. We saw the images, but she took the disc with her to share with Dr Kuo her surgeon\par \par CT 9/9/19 : [facility] CT and/pelvis : Recurrent/metastatic liposarcoma (3. x 5.7 x 7 cm ) of the R nephrectomy fossa with posterior invasion of the erector spinae muscle, lateral extension inferior to the liver, superior extension to involve the diaphragmatic elle and inferior extension into the retroperitoneum is without change since 6/2019. \par CT scan 11/29/2019: [place] [facility]: impress relatively stable right renal fossa mimi with extension; \par relatively stable para aortic adenopathy

## 2020-01-07 NOTE — ASSESSMENT
[Palliative Care Plan] : not applicable at this time [Palliative] : Goals of care discussed with patient: Palliative [FreeTextEntry1] : Complex issue for this 79 F not resectable for cure from retroperitoneal WDDDLS. RT, systemic therapy and surgery all can provide palliation for her. \par \par WDDDLS: Had resection (including hemicolectomy) through Dr Kuo at [facility], recovered fully. She has follow up with him in 02/2020\par \par She is not keen on systemic therapy, understandably with the low response rate\par \par I still suggest no operation right now as she is asymptomatic.\par \par Doxil or a CDK4-6 inhibitor may be an option for 2 lines of Rx followed by drugs like trabectedin or eribulin. All have a < 10% chance of RECIST response, and stable disease is most common "best" outcome. \par \par She will follow up with us in late 01/2020 with new imaging of abd pelvis and will see Dr Kuo after that. \par \par Her sarcoma diagnosis does not materially impact her other medical diagnoses at present.\par Repeat imaging shows stability of disease.\par RTC 6 months. The patient speaks limited amounts of English. I spoke in Kiswahili with the patient and a family member. She will return in July; CT to be done June 2020\par

## 2020-07-10 DIAGNOSIS — R91.1 SOLITARY PULMONARY NODULE: ICD-10-CM

## 2020-07-13 ENCOUNTER — OUTPATIENT (OUTPATIENT)
Dept: OUTPATIENT SERVICES | Facility: HOSPITAL | Age: 80
LOS: 1 days | Discharge: HOME | End: 2020-07-13

## 2020-07-13 DIAGNOSIS — Z90.49 ACQUIRED ABSENCE OF OTHER SPECIFIED PARTS OF DIGESTIVE TRACT: Chronic | ICD-10-CM

## 2020-07-13 DIAGNOSIS — Z41.9 ENCOUNTER FOR PROCEDURE FOR PURPOSES OTHER THAN REMEDYING HEALTH STATE, UNSPECIFIED: Chronic | ICD-10-CM

## 2020-07-13 DIAGNOSIS — C78.6 SECONDARY MALIGNANT NEOPLASM OF RETROPERITONEUM AND PERITONEUM: ICD-10-CM

## 2020-07-13 DIAGNOSIS — Z98.891 HISTORY OF UTERINE SCAR FROM PREVIOUS SURGERY: Chronic | ICD-10-CM

## 2020-07-15 ENCOUNTER — APPOINTMENT (OUTPATIENT)
Dept: HEMATOLOGY ONCOLOGY | Facility: CLINIC | Age: 80
End: 2020-07-15
Payer: MEDICARE

## 2020-07-15 VITALS
BODY MASS INDEX: 20.97 KG/M2 | TEMPERATURE: 98.5 F | OXYGEN SATURATION: 99 % | RESPIRATION RATE: 16 BRPM | WEIGHT: 114.64 LBS | HEART RATE: 84 BPM | DIASTOLIC BLOOD PRESSURE: 77 MMHG | SYSTOLIC BLOOD PRESSURE: 134 MMHG

## 2020-07-15 PROCEDURE — 99215 OFFICE O/P EST HI 40 MIN: CPT

## 2020-07-15 NOTE — CONSULT LETTER
[Dear  ___] : Dear  [unfilled], [Consult Letter:] : I had the pleasure of evaluating your patient, [unfilled]. [Please see my note below.] : Please see my note below. [Consult Closing:] : Thank you very much for allowing me to participate in the care of this patient.  If you have any questions, please do not hesitate to contact me. [Sincerely,] : Sincerely, [FreeTextEntry2] : Socorro Jolly MD\par [id number]\par [address]\par [place] [id number] [FreeTextEntry3] : Matty Thompson

## 2020-07-15 NOTE — RESULTS/DATA
[FreeTextEntry1] : review of CT scan performed in June 2020 with patient and son Ramakrishna: there is an enlargement of the tumor mass in the right nephectomy bed with close relationship to the small bowel mesentary

## 2020-07-15 NOTE — ASSESSMENT
[Palliative] : Goals of care discussed with patient: Palliative [Palliative Care Plan] : Patient was apprised of incurable nature of disease.  Hospice and Palliative care options discussed. [Patient/Caregiver not ready to engage] : : Patient/Caregiver not ready to engage [Designated Health Care Proxy] : Designated Health Care Proxy [Name: ___] : Name: [unfilled] [Relationship: ___] : Relationship: [unfilled] [FreeTextEntry1] : CRISTEL Oliva is a patient with advanced sarcoma. THe patient has a poor performance status and she has help at home 5 days per week. She has been told by physicians at [facility] that she is not a candidate for surgery nor for chemotherapy. \par Review of the CT scan shows enlarging masses of the tumor in the region of the right nephrectomy region are. The tumor is close to the small bowel mesentery. \par Patient and son do not wish to use chemotherapy or radiotherapy. I have told them that the areas on CT scan were likely the case of gastrointestinal bleeding and they wish to use only transfusion support at [facility]. The transfusion center at [facility] is close to her home. I discussed palliative care and hospice care with the patient and son. Ramakrishna also discussed with me that her physician were considering medication to keep her "comfortable".\par She is not a candidate for further surgery by my examination. I told Ramakrishna and his mother that I could send her for a surgery opinion at [facility]; however son and patient have do not want this option\par I discussed chemotherapy treatment with son Ramakrishna; he also does not wish that she receive chemotherapy for sarcoma  [AdvancecareDate] : 07/15/2020

## 2020-10-11 ENCOUNTER — OUTPATIENT (OUTPATIENT)
Dept: OUTPATIENT SERVICES | Facility: HOSPITAL | Age: 80
LOS: 1 days | Discharge: HOME | End: 2020-10-11

## 2020-10-11 DIAGNOSIS — Z41.9 ENCOUNTER FOR PROCEDURE FOR PURPOSES OTHER THAN REMEDYING HEALTH STATE, UNSPECIFIED: Chronic | ICD-10-CM

## 2020-10-11 DIAGNOSIS — Z90.49 ACQUIRED ABSENCE OF OTHER SPECIFIED PARTS OF DIGESTIVE TRACT: Chronic | ICD-10-CM

## 2020-10-11 DIAGNOSIS — C78.6 SECONDARY MALIGNANT NEOPLASM OF RETROPERITONEUM AND PERITONEUM: ICD-10-CM

## 2020-10-11 DIAGNOSIS — Z98.891 HISTORY OF UTERINE SCAR FROM PREVIOUS SURGERY: Chronic | ICD-10-CM

## 2020-10-14 ENCOUNTER — APPOINTMENT (OUTPATIENT)
Dept: HEMATOLOGY ONCOLOGY | Facility: CLINIC | Age: 80
End: 2020-10-14
Payer: MEDICARE

## 2020-10-14 VITALS
DIASTOLIC BLOOD PRESSURE: 70 MMHG | SYSTOLIC BLOOD PRESSURE: 120 MMHG | OXYGEN SATURATION: 100 % | TEMPERATURE: 98.7 F | BODY MASS INDEX: 16.53 KG/M2 | HEART RATE: 89 BPM | WEIGHT: 90.39 LBS | RESPIRATION RATE: 15 BRPM

## 2020-10-14 DIAGNOSIS — I10 ESSENTIAL (PRIMARY) HYPERTENSION: ICD-10-CM

## 2020-10-14 PROCEDURE — 99215 OFFICE O/P EST HI 40 MIN: CPT

## 2020-10-16 NOTE — REVIEW OF SYSTEMS
[Recent Change In Weight] : ~T recent weight change [Difficulty Walking] : difficulty walking [Negative] : Heme/Lymph [Fever] : no fever [Chills] : no chills [Night Sweats] : no night sweats [Joint Pain] : no joint pain [Joint Stiffness] : no joint stiffness [Muscle Pain] : no muscle pain [Muscle Weakness] : no muscle weakness [Confused] : no confusion [Dizziness] : no dizziness [Fainting] : no fainting

## 2020-10-16 NOTE — PHYSICAL EXAM
[Completely disabled. Cannot carry on any self care. Totally confined to bed or chair] : Status 4- Completely disabled. Cannot carry on any self care. Totally confined to bed or chair [Cachectic] : cachectic [Normal] : affect appropriate [Ulcers] : no ulcers [Mucositis] : no mucositis [Thrush] : no thrush [Vesicles] : no vesicles [de-identified] : catarracts

## 2020-10-16 NOTE — HISTORY OF PRESENT ILLNESS
[Disease: _____________________] : Disease: [unfilled] [T: ___] : T[unfilled] [N: ___] : N[unfilled] [M: ___] : M[unfilled] [AJCC Stage: ____] : AJCC Stage: [unfilled] [Date: ____________] : Patient's last distress assessment performed on [unfilled]. 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 She had positive margin and after review at [facility] tumor board, the plan was for close surveillance and not adjuvant therapy. \par \par [id number]: She did well - she had lost a lot of weight before diagnosis most of which she regained and has kept on: She gained 30 lbs. She has no fatigue, lethargy or pains. She is also being followed for lung nodule vs scar which was read as stable stable.\par \par 12/08/2017: Dr Salomon reviewed the MRI and saw new lesions within the psoas muscle which is larger than the other side; should be smaller post-op. Final report showed peritoneal mass right sided, near transverse colon, consistent with metastatic disease, 4.2 x 3.3 cm.  ALso with RIGHT nephrectomy seen, enhancing lesion at level of L3 spinous process, ? recurrent disease, dumbbell shape, 2.9 x 2.2 cm, through retroperitoneum into right paraspinal musculature. \par \par 12/28/2017: Seen for advice, opinion on further management. Some pain at a drain site in the posterior upper abdomen, otherwise feeling well. \par \par 3/26/18: Here after a new staging scan showing minimal change in intraperitoneal lesion near colon; retroperitoneal mass penetrating paraspinal muscle at L3 is unchanged. She still notes persistent, intermittent RLQ abdominal pain, relieved by bowel movements. No diarrhea, no blood in stool.\par \par 6/11/18 : Here on an urgent basis. Has been having throbbing pain in the mid to RLQ abd for the last 2 weeks. Only after she eats and solid foods. Energy is fair but fatigued at times. She is concerned that her lesions are growing rapidly in the abd. She had scheduled an appt with Dr. Guerra on 6/28/18. Admitted and Rx for diverticulitis. \par \par 06/28/2018: She is at the end of her Abx for her Rx of diverticulitis and still feels very poorly, with pelvic > abdominal discomfort. The scans were not terribly impressive with respect to tumor bulk; it is not clear if there is an admixture of tumor and inflammation at the site of the diverticulitis. Decreased oral intake but no N/V or other obstructive symptoms per se. \par \par 09/06/2018: She notes increased weight/heaviness benny after meals; imaging shows tumor worse along asc colon, perhaps accounting for these symptoms. There is also a mass involving psoas paraspinal muscles at L3 or so that is slightly larger, but not presently causing symptoms. She is about to go to [facility] for a trip and had a good time on a cruise to the [facility] over the summer. \par \par 10/11/18 : Resection of liposarcoma recurrence, R colon with retroperitoneal mass; hemicolectomy at [facility], deiff liposarcoma, 8.5 cm in largest dimension, involving retroperitoneum and colonic serosa to submucosa, margins negative. Perihepatic nodule and mesenteric  bx was positive for dediff liposarcoma.\par \par 12/17/18 Post operative CT Abd/Pelvis stable post operative scans\par \par 1/3/19 : Here for a follow up. Recovered fully form surgery. Diabetes, now on metformin 500 mg daily. Anxiety related HTN managed by her PCP, only with MD visits. \par She feels well in general. \par \par 07/10/2019 : New set of scans ordered on 6/20/19. She is asymptomatic despite the recurrence save for some gassiness / gurgling she can hear in her abdomen. \par \par 9/9/19 : [facility] CT and/pelvis : Recurrent/metastatic liposarcoma (3.x x 5.7 x 7 cm  previously 3.9 x 3.4 x 4.5 x) of the R nephrectomy fossa with posterior invasion of the erector spinae muscle, lateral extension inferior to the liver, superior extension to involve the diaphragmatic elle and inferior extension into the retroperitoneum is without change since 6/2019. \par \par 9/25/19 : Here for a follow up. CT showing mild worsening  in one site, continues to be asymptomatic. \par \par She is now seen for advice, opinion on further management.  [FreeTextEntry1] : palliative care transfusion [de-identified] : She stopped going for transfusion in [facility] Dr Dang\par Her last transfusion was one month ago. She was receiving transfusion once a week. \par No bleeding  noted. She does not want to have more transfusion. No fainting. The patient has a home attendant for five days a week. The son is not sure if she is on hospice care

## 2020-10-16 NOTE — ASSESSMENT
[Palliative] : Goals of care discussed with patient: Palliative [With Patient/Caregiver] : : With Patient/Caregiver [Palliative Care Plan] : patient was apprised of terminal prognosis of 6 months or less. Referral made to Hospice or Palliative Care Service [Designated Health Care Proxy] : Designated Health Care Proxy [Name: ___] : Name: [unfilled] [DNR] : DNR [Relationship: ___] : Relationship: [unfilled] [Hospice consultation requested (Advance Care Planning)] : Hospice consultation requested (Advance Care Planning) [FreeTextEntry1] : CRISTEL Oliva is a 80 year old female with advanced sarcoma and poor performance status who is currently not a candidate for chemotherapy. She has been receiving supportive transfusion at [place]; she now refuses transfusion and does not want needle placement or blood testing or further treatment. She will elect for palliative and hospice care; according to Ramakrishna her son and health care proxy who is present today outside building (COVID 19 precaution) both the patient and the family is aware of her worsening disease and they are agreeable to home based comfort care. The patient and family were told by me that life survival cannot be predicted and that patient may pass away at home on hospice care. To request meals on wheels. To refer for hospice care service; Our social service department will evaluate.\par I called primary care physician Dr Jolly  and left a message on the answering service; Doctor to call back. I will inform PCP of patient condition and recommendation for hospice care [AdvancecareDate] : 10/14/2020

## 2020-11-20 ENCOUNTER — OUTPATIENT (OUTPATIENT)
Dept: OUTPATIENT SERVICES | Facility: HOSPITAL | Age: 80
LOS: 1 days | Discharge: HOME | End: 2020-11-20

## 2020-11-20 DIAGNOSIS — Z90.49 ACQUIRED ABSENCE OF OTHER SPECIFIED PARTS OF DIGESTIVE TRACT: Chronic | ICD-10-CM

## 2020-11-20 DIAGNOSIS — Z41.9 ENCOUNTER FOR PROCEDURE FOR PURPOSES OTHER THAN REMEDYING HEALTH STATE, UNSPECIFIED: Chronic | ICD-10-CM

## 2020-11-20 DIAGNOSIS — Z98.891 HISTORY OF UTERINE SCAR FROM PREVIOUS SURGERY: Chronic | ICD-10-CM

## 2020-11-20 DIAGNOSIS — C78.6 SECONDARY MALIGNANT NEOPLASM OF RETROPERITONEUM AND PERITONEUM: ICD-10-CM

## 2020-11-23 DIAGNOSIS — E11.9 TYPE 2 DIABETES MELLITUS W/OUT COMPLICATIONS: ICD-10-CM

## 2020-11-23 PROBLEM — C79.9: Status: ACTIVE | Noted: 2018-01-02

## 2020-11-23 PROBLEM — C78.6 METASTASIS TO PERITONEUM: Status: ACTIVE | Noted: 2017-12-28

## 2020-11-23 PROBLEM — D50.8 OTHER IRON DEFICIENCY ANEMIA: Status: ACTIVE | Noted: 2018-01-02

## 2020-11-23 NOTE — HISTORY OF PRESENT ILLNESS
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 She had positive margin and after review at [facility] tumor board, the plan was for close surveillance and not adjuvant therapy. \par \par [id number]: She did well - she had lost a lot of weight before diagnosis most of which she regained and has kept on: She gained 30 lbs. She has no fatigue, lethargy or pains. She is also being followed for lung nodule vs scar which was read as stable stable.\par \par 12/08/2017: Dr Salomon reviewed the MRI and saw new lesions within the psoas muscle which is larger than the other side; should be smaller post-op. Final report showed peritoneal mass right sided, near transverse colon, consistent with metastatic disease, 4.2 x 3.3 cm.  ALso with RIGHT nephrectomy seen, enhancing lesion at level of L3 spinous process, ? recurrent disease, dumbbell shape, 2.9 x 2.2 cm, through retroperitoneum into right paraspinal musculature. \par \par 12/28/2017: Seen for advice, opinion on further management. Some pain at a drain site in the posterior upper abdomen, otherwise feeling well. \par \par 3/26/18: Here after a new staging scan showing minimal change in intraperitoneal lesion near colon; retroperitoneal mass penetrating paraspinal muscle at L3 is unchanged. She still notes persistent, intermittent RLQ abdominal pain, relieved by bowel movements. No diarrhea, no blood in stool.\par \par 6/11/18 : Here on an urgent basis. Has been having throbbing pain in the mid to RLQ abd for the last 2 weeks. Only after she eats and solid foods. Energy is fair but fatigued at times. She is concerned that her lesions are growing rapidly in the abd. She had scheduled an appt with Dr. Guerra on 6/28/18. Admitted and Rx for diverticulitis. \par \par 06/28/2018: She is at the end of her Abx for her Rx of diverticulitis and still feels very poorly, with pelvic > abdominal discomfort. The scans were not terribly impressive with respect to tumor bulk; it is not clear if there is an admixture of tumor and inflammation at the site of the diverticulitis. Decreased oral intake but no N/V or other obstructive symptoms per se. \par \par 09/06/2018: She notes increased weight/heaviness benny after meals; imaging shows tumor worse along asc colon, perhaps accounting for these symptoms. There is also a mass involving psoas paraspinal muscles at L3 or so that is slightly larger, but not presently causing symptoms. She is about to go to [facility] for a trip and had a good time on a cruise to the [facility] over the summer. \par \par 10/11/18 : Resection of liposarcoma recurrence, R colon with retroperitoneal mass; hemicolectomy at [facility], deiff liposarcoma, 8.5 cm in largest dimension, involving retroperitoneum and colonic serosa to submucosa, margins negative. Perihepatic nodule and mesenteric  bx was positive for dediff liposarcoma.\par \par 12/17/18 Post operative CT Abd/Pelvis stable post operative scans\par \par 1/3/19 : Here for a follow up. Recovered fully form surgery. Diabetes, now on metformin 500 mg daily. Anxiety related HTN managed by her PCP, only with MD visits. \par She feels well in general. \par \par 07/10/2019 : New set of scans ordered on 6/20/19. She is asymptomatic despite the recurrence save for some gassiness / gurgling she can hear in her abdomen. \par \par 9/9/19 : [facility] CT and/pelvis : Recurrent/metastatic liposarcoma (3.x x 5.7 x 7 cm  previously 3.9 x 3.4 x 4.5 x) of the R nephrectomy fossa with posterior invasion of the erector spinae muscle, lateral extension inferior to the liver, superior extension to involve the diaphragmatic elle and inferior extension into the retroperitoneum is without change since 6/2019. \par \par 9/25/19 : Here for a follow up. CT showing mild worsening  in one site, continues to be asymptomatic. \par \par She is now seen for advice, opinion on further management.  [FreeTextEntry1] : palliative care transfusion [de-identified] : She stopped going for transfusion in [facility] Dr Dang\par Her last transfusion was one month ago. She was receiving transfusion once a week. \par No bleeding  noted. She does not want to have more transfusion. No fainting. The patient has a home attendant for five days a week. The son is not sure if she is on hospice care

## 2020-11-25 ENCOUNTER — APPOINTMENT (OUTPATIENT)
Dept: HEMATOLOGY ONCOLOGY | Facility: CLINIC | Age: 80
End: 2020-11-25

## 2020-11-25 DIAGNOSIS — C79.9 SECONDARY MALIGNANT NEOPLASM OF UNSPECIFIED SITE: ICD-10-CM

## 2020-11-25 DIAGNOSIS — C78.6 SECONDARY MALIGNANT NEOPLASM OF RETROPERITONEUM AND PERITONEUM: ICD-10-CM

## 2020-11-25 DIAGNOSIS — D50.8 OTHER IRON DEFICIENCY ANEMIAS: ICD-10-CM

## 2020-11-25 DIAGNOSIS — C64.9 MALIGNANT NEOPLASM OF UNSPECIFIED KIDNEY, EXCEPT RENAL PELVIS: ICD-10-CM

## 2020-11-25 DIAGNOSIS — C49.9 SECONDARY MALIGNANT NEOPLASM OF UNSPECIFIED SITE: ICD-10-CM

## 2021-10-06 PROBLEM — I10 ESSENTIAL HYPERTENSION: Status: ACTIVE | Noted: 2018-01-02

## 2022-01-04 NOTE — PRE-OP CHECKLIST - CHLOROHEXIDINE WASH IN ASU
[facility] [phone]   [facility] [phone],  [phone]   11-Oct-2018 07:11

## 2024-02-12 NOTE — PATIENT PROFILE ADULT - FALL HARM RISK CONCLUSION
no lesions, no deformities, no traumatic injuries, no significant scars are present, chest wall non-tender, no masses present, breathing is unlabored without accessory muscle use,normal breath sounds Fall Risk

## 2024-04-01 NOTE — PATIENT PROFILE ADULT - STATED REASON FOR ADMISSION
[facility] Refill Guideline reviewed.  Medication meets criteria for refill.     Laparoscopic Colon resection
